# Patient Record
Sex: MALE | Race: WHITE
[De-identification: names, ages, dates, MRNs, and addresses within clinical notes are randomized per-mention and may not be internally consistent; named-entity substitution may affect disease eponyms.]

---

## 2018-10-13 ENCOUNTER — HOSPITAL ENCOUNTER (EMERGENCY)
Dept: HOSPITAL 25 - ED | Age: 68
LOS: 1 days | Discharge: HOME | End: 2018-10-14
Payer: COMMERCIAL

## 2018-10-13 DIAGNOSIS — R11.0: ICD-10-CM

## 2018-10-13 DIAGNOSIS — K52.9: Primary | ICD-10-CM

## 2018-10-13 DIAGNOSIS — R07.9: ICD-10-CM

## 2018-10-13 PROCEDURE — 82150 ASSAY OF AMYLASE: CPT

## 2018-10-13 PROCEDURE — 86140 C-REACTIVE PROTEIN: CPT

## 2018-10-13 PROCEDURE — 86900 BLOOD TYPING SEROLOGIC ABO: CPT

## 2018-10-13 PROCEDURE — 84484 ASSAY OF TROPONIN QUANT: CPT

## 2018-10-13 PROCEDURE — 80053 COMPREHEN METABOLIC PANEL: CPT

## 2018-10-13 PROCEDURE — 93005 ELECTROCARDIOGRAM TRACING: CPT

## 2018-10-13 PROCEDURE — 86850 RBC ANTIBODY SCREEN: CPT

## 2018-10-13 PROCEDURE — 96360 HYDRATION IV INFUSION INIT: CPT

## 2018-10-13 PROCEDURE — 36415 COLL VENOUS BLD VENIPUNCTURE: CPT

## 2018-10-13 PROCEDURE — 86901 BLOOD TYPING SEROLOGIC RH(D): CPT

## 2018-10-13 PROCEDURE — 85730 THROMBOPLASTIN TIME PARTIAL: CPT

## 2018-10-13 PROCEDURE — 85025 COMPLETE CBC W/AUTO DIFF WBC: CPT

## 2018-10-13 PROCEDURE — 85610 PROTHROMBIN TIME: CPT

## 2018-10-13 PROCEDURE — 99283 EMERGENCY DEPT VISIT LOW MDM: CPT

## 2018-10-13 PROCEDURE — 81003 URINALYSIS AUTO W/O SCOPE: CPT

## 2018-10-13 PROCEDURE — 83690 ASSAY OF LIPASE: CPT

## 2018-10-13 PROCEDURE — 83605 ASSAY OF LACTIC ACID: CPT

## 2018-10-13 PROCEDURE — 74174 CTA ABD&PLVS W/CONTRAST: CPT

## 2018-10-13 PROCEDURE — 71275 CT ANGIOGRAPHY CHEST: CPT

## 2018-10-13 PROCEDURE — 83735 ASSAY OF MAGNESIUM: CPT

## 2018-10-13 NOTE — XMS REPORT
Fawad Johnson

 Created on:2018



Patient:Fawad Johnson

Sex:Male

:1950

External Reference #:[id number]





Demographics







 Address  [address]



   [place] [address]

 

 Home Phone  [phone]

 

 Mobile Phone  [phone]

 

 Work Phone  [phone]

 

 Email Address  [email or web address]

 

 Preferred Language  English

 

 Marital Status  Not  Or 

 

 Amish Affiliation  Unknown

 

 Race  White

 

 Ethnic Group  Not  Or 









Author







 Organization  [facility]

 

 Address  [address]



   [place] [address]

 

 Phone  [phone]









Support







 Name  Relationship  Address  Phone

 

 Jordy Johnson  Unavailable  Unavailable  [phone]

 

 Tez Johnson  Unavailable  Unavailable  [phone]

 

 Joby Johnson  Unavailable  Unavailable  [phone]









Care Team Providers







 Name  Role  Phone

 

 Kinjal uTrcios MD  Primary Care Physician  Unavailable









Payers







 Type  Date  Identification Numbers  Payment Provider  Subscriber

 

 Commercial    Policy Number: [id number]  [facility]  Fawad Johnson









 PayID: [id number]  [address]









 [place] [address]







Advance Directives







 Type  Date  Description  Status  Comment

 

 Other Directive  2018  Health Care Proxy  Current and Verified  







Problems







 Date  Description  Provider  Status

 

 Onset: 2011  Heart valve replacement  Dianna Blanco M.D., FACP  Active









   Note: Aortic, tilting disc, INR goal 2.5-3.5









 Onset: 2017  Aneurysm of thoracic aorta  Sheryl Hernandez M.D.  Active

 

 Onset: 2015  History of repair of thoracic  Sheryl Hernandez M.D.  Active



   aortic aneurysm    

 

 Onset: 2017  Hereditary motor and sensory  Terry Steve M.D.,FACP  
Active



   neuropathy    









   Note: CMT ?type









 Onset: 2018  Aneurysm of infrarenal abdominal  Kinjal Turcios M.D.  
Active



   aorta    

 

 Onset: 2011  Kidney stone  Dianna Blanco M.D., FACP  Active

 

 Onset: 2014  Pure hypercholesterolemia  Sheryl Hernandez M.D.  Active

 

 Onset: 2014  Mitral valve disorder  Sheryl Hernandez M.D.  Active

 

 Onset: 2014  Congenital dilation of ascending  Sheryl Hernandez M.D.  Active



   aorta    

 

 Onset: 2015  Collagen disease  Sheryl Hernandez M.D.  Active

 

 Onset: 2015  Carpal tunnel syndrome  Jaclyn Ornelas M.D.  Active

 

 Onset: 2015  Polyneuropathy  Jaclyn Ornelas M.D.  Active

 

 Onset: 2017  Localized, primary osteoarthritis of  Mai Arzate M.D.  
Active



   the pelvic region and thigh    

 

 Onset: 2017  Periprosthetic osteolysis  Mai Arzate M.D.  Active

 

 Onset: 2017  Acquired thrombocytopenia  Terry Steve,  Active



     M.D.,FACP  









   Note: mild









 Onset: 2017  Prosthetic arthroplasty of the hip  Mai Arzate M.D.  
Active

 

 Onset: 2018  Congenital insufficiency of aortic  Sheryl Hernandez M.D.  
Active



   valve    

 

 Onset: 2017  Aortic valve disorder  Sheryl Hernandez M.D.  Inactive









 Inactive: 2017







Family History







 Date  Family Member(s)  Problem(s)  Comments

 

   General  Diabetes  

 

   General  Heart Disease  

 

   General  Hypertension  

 

   General  Cancer  

 

   General  Rheumatoid Arthritis  

 

   Father   due to Diabetes  ()

 

   Father  Coronary Artery Disease (CAD)  SD age 72 to CAGB.

 

   Father   due to CAD  ()

 

   Father  Aortic Aneurysm  

 

   Mother   due to Cancer, Breast  ()

 

   Mother  Breast Cancer  

 

   Children  None  

 

   Siblings  3  

 

   First Brother  Heart Disease  

 

   First Brother  65  

 

   Second Brother  60  

 

   Third Brother  58  







Social History







 Type  Date  Description  Comments

 

 Marital Status  2015    

 

 Lives With    Alone  

 

 Occupation    Professor  Nutritional sciences at



       [employer]. 2016: phased



       [employer]

 

 Cigarette Use    Never Smoked Cigarettes  

 

 ETOH Use    Denies alcohol use  

 

 Smoking    Patient has never smoked  

 

 Recreational Drug Use    Denies Drug Use  

 

 Daily Caffeine    consumes chocolate  



     frequently  

 

 Daily Caffeine    Mountain Dew  On occasion during long



       drive

 

 Exercise Type/Frequency    Exercises regularly  

 

 Exercise Limitations    Joint Pain  







Allergies, Adverse Reactions, Alerts







 Date  Description  Reaction  Status  Severity  Comments

 

 2011  No Known Drug Allergy    active    







Medications







 Medication  Date  Status  Form  Strength  Qnty  SIG  Indications  Ordering



                 Provider

 

 Toprol XL    Active  Tablets  25mg  90tab  1 by mouth    Sheryl    ER 24HR    s  every day    ALF Hernandez

 

 Warfarin    Active  Tablets  5mg  180ta  2 tablet by    Sheryl



 Sodium  /2013        bs  mouth mon, wed    David,



             fri, and 1.5 (    M.D.



             7.5mg)    



             tues,thur,sat,    



             sun and as    



             directed    

 

 Atorvastatin    Active  Tablets  10mg  60tab  take 1 tablet  E78.0  
Kinjal



 Calcium  /2012        s  on --    ALF Turcios

 

 Co Q-10    Active  Capsules  200mg  90cap  1 po qd    Unknown



           s      

 

 Amoxicillin    Active  Capsules  500mg  4caps  4 cap po  1 hr    Unknown



   /0000          prior to dental    



             work    

 

 Latanoprost    Active  Solution  0.005%    1 gtt OU daily    Carmine,



                 Jono MURILLO MD

 

 Timolol    Active  Solution  0.5%    Instill 1 Drop    Carmine



 Male          Into Both Eyes    Jono MURILLO



             Twice Daily    MD

 

 Fluoride Gel    Active        everyday    Unknown



   /0000              

 

 Tylenol    Active    325mg    1 po prn    Unknown



   /0000              

 

                 

 

 Azithromycin    Hx  Tablets  250mg  6tabs  2 tabs by mouth  R05            on day 1; 1 tab    Cotton,



   -          by mouth every    M.D.



             day on days 2-              

 

 Lovenox    Hx  Solution  80mg/0.8M  10uni  sc every 12          L  ts  hours, only for    Henry,



   -          medical    M.D.



             procedures..    



                 

 

 Lovenox    Hx  Solution  100mg/ml  20ml  1              subcutaneously    Henry,



   -          every 12 hours    M.D.



             as directed    



                 

 

 Lovenox    Hx  Solution  80mg/0.8M  10uni  once daily          L  ts      Henry,



   -              M.D.



                 

 

 Lovenox    Hx  Solution  100mg/ml  20uni  90mg sq twice a      day as directed    Rommel Hernandez              M.D.



                 

 

 Wrist Brace    Hx  Misc    2unit  left and right;    Jaclyn TENA



 Ultra-Lite  /        s  use at night    Stackman,



 Carpal  -          and as needed    M.D.



 Tunnel/One  12/10              



 Size, Open                



 Thumb                

 

 Fish Oil    Hx  Oil      1200mg 1 po    Dianna



   /          daily    Rommel Blanco M.D., FACP



                 

 

 Coumadin    Hx  Tablets  2.5mg  100ta  use as directed    Unknown



   /0000        bs      



   -              



                 

 

 Zestril    Hx  Tablets  2.5mg  60tab  1 tablet daily    Unknown



   /0000        s      



   -              



                 

 

 Toprol XL    Hx  Tablets  50mg  15tab  1/2 tablet    Sheryl



   /0000    ER 24HR    s  daily    Rommel Hernandez M.D.



                 

 

 Lumigan    Hx  Solution  0.01%    1 drop daily  365.10  Unknown



   /0000              



   -              



                 

 

 Aspirin    Hx  Chewtabs  81mg  30uni  daily - Patient    Unknown



 Childrens  /0000        ts  Not Taking    



   -              



                 

 

 Glucosamine  /00  Hx  Tablets          Unknown



   /0000              



   -              



                 

 

 Chondroitin  /  Hx            Unknown



   /0000              



   -              



                 

 

 Glucosamine &  00  Hx  Packet  [id number]    1 po qd-    Unknown



 Chrondroitin  /0000      mg    Patient Not    



   -          Taking    



                 

 

 B6  00  Hx  Tablets  50mg    1 po qd    Unknown



   /0000              



   -              



                 

 

 Ferrous  00/00  Hx  Tablets  324(38Fe)    1 by mouth    Unknown



 Gluconate  /0000      mg    every day (    



   -          restarted    



             taking 2 weeks    



   /          ago begin 2018)    

 

 Glucosamine  00  Hx  Tablets  1500mg    1 tablet po    Unknown



 HCL  /0000          daily as needed    



   -          ( last taken    



   2017)    



                 







Medications Administered in Office







 Medication  Date  Status  Form  Strength  Qnty  SIG  Indications  Ordering



                 Provider

 

 Technetium TC    Administered  Injection          Kosta MURILLO



 99M  018              DO [place]



 Tetrofosmin,                FACC



 Per Unit Dose                



 Up To 40                



 Millicuries                







Immunizations







 CPT Code  Status  Date  Vaccine  Lot #

 

 22445  Given  2016  Pneumonia Vaccine  [id number]

 

 42349  Given  2015  Pneumococcal Conjugate Vaccine 13 Valent For  [id number]



       Intramuscular Use  

 

 52020  Given  2011  Zoster (Zostavax)  [id number]

 

 87658  Given  2011  Tdap - Tetanus/Diptheria/Acellular Pertussis  [id number]







Vital Signs







 Date  Vital  Result  Comment

 

 2018  Height  73.5 inches  6'1.50"









 Weight  208.00 lb  w/shoes

 

 Heart Rate  74 /min  

 

 BP Systolic Sitting  106 mmHg  Lue reg cuff

 

 BP Diastolic Sitting  70 mmHg  Lue reg cuff

 

 BP Systolic Standing  114 mmHg  Lue reg cuff

 

 BP Diastolic Standing  74 mmHg  Lue reg cuff

 

 BMI (Body Mass Index)  27.1 kg/m2  

 

 Ejection Fraction  55-60%  Echo 2018  Height  73.5 inches  6'1.50"









 Weight  210.00 lb  

 

 Heart Rate  65 /min  

 

 BP Systolic Sitting  98 mmHg  

 

 BP Diastolic Sitting  53 mmHg  

 

 O2 % BldC Oximetry  95 %  

 

 BMI (Body Mass Index)  27.3 kg/m2  









 2018  Height  73.5 inches  6'1.50"









 Weight  212.50 lb  

 

 Heart Rate  55 /min  

 

 BP Systolic  110 mmHg  

 

 BP Diastolic  66 mmHg  

 

 Body Temperature  96.1 F  

 

 O2 % BldC Oximetry  96 %  

 

 BMI (Body Mass Index)  27.7 kg/m2  









 2018  Height  73.5 inches  6'1.50"









 Weight  216.00 lb  with out shoes

 

 Heart Rate  66 /min  

 

 BP Systolic Sitting  100 mmHg  Lue reg cuff

 

 BP Diastolic Sitting  70 mmHg  Lue reg cuff

 

 BP Systolic Standing  114 mmHg  Lue reg cuff

 

 BP Diastolic Standing  70 mmHg  Lue reg cuff

 

 Respiratory Rate  18 /min  

 

 BMI (Body Mass Index)  28.1 kg/m2  

 

 Ejection Fraction  55-60%  date 17 ECHO









 2018  Height  73.5 inches  6'1.50"









 Weight  213.00 lb  

 

 Heart Rate  66 /min  

 

 BP Systolic Sitting  108 mmHg  

 

 BP Diastolic Sitting  68 mmHg  

 

 Respiratory Rate  16 /min  

 

 BMI (Body Mass Index)  27.7 kg/m2  









 2018  Weight  220.00 lb  









 Heart Rate  62 /min  

 

 BP Systolic Sitting  94 mmHg  

 

 BP Diastolic Sitting  60 mmHg  

 

 Body Temperature  96.9 F  

 

 O2 % BldC Oximetry  99 %  









 2018  Weight  227.00 lb  









 Heart Rate  63 /min  

 

 BP Systolic  110 mmHg  

 

 BP Diastolic  60 mmHg  

 

 Body Temperature  96.2 F  

 

 O2 % BldC Oximetry  94 %  









 2018  Height  73.5 inches  6'1.50"









 Weight  215.00 lb  

 

 Heart Rate  70 /min  

 

 BP Systolic Sitting  120 mmHg  

 

 BP Diastolic Sitting  60 mmHg  

 

 Respiratory Rate  16 /min  

 

 BMI (Body Mass Index)  28.0 kg/m2  









 2018  Height  73 inches  6'1"









 Weight  221.00 lb  without shoes

 

 Heart Rate  68 /min  

 

 BP Systolic Sitting  110 mmHg  Lue lg cuff

 

 BP Diastolic Sitting  62 mmHg  Lue lg cuff

 

 BP Systolic Standing  112 mmHg  Lue lg cuff

 

 BP Diastolic Standing  64 mmHg  Lue lg cuff

 

 Respiratory Rate  17 /min  

 

 BMI (Body Mass Index)  29.2 kg/m2  









 2018  Height  73 inches  6'1"









 Weight  219.00 lb  

 

 Heart Rate  67 /min  

 

 BP Systolic  118 mmHg  

 

 BP Diastolic  64 mmHg  

 

 Body Temperature  96.5 F  

 

 O2 % BldC Oximetry  98 %  

 

 BMI (Body Mass Index)  28.9 kg/m2  









 2017  Height  73 inches  6'1"









 Weight  215.00 lb  

 

 Heart Rate  56 /min  

 

 BP Systolic  124 mmHg  

 

 BP Diastolic  84 mmHg  

 

 Body Temperature  98.0 F  

 

 O2 % BldC Oximetry  96 %  

 

 BMI (Body Mass Index)  28.4 kg/m2  









 2017  Height  72 inches  6'0"









 Weight  224.00 lb  

 

 Heart Rate  70 /min  

 

 BP Systolic Sitting  112 mmHg  

 

 BP Diastolic Sitting  62 mmHg  

 

 Body Temperature  97.0 F  

 

 O2 % BldC Oximetry  95 %  

 

 BMI (Body Mass Index)  30.4 kg/m2  









 2017  Height  73 inches  6'1"









 Weight  221.00 lb  

 

 Heart Rate  60 /min  

 

 BP Systolic Sitting  106 mmHg  

 

 BP Diastolic Sitting  66 mmHg  

 

 Body Temperature  97.0 F  

 

 O2 % BldC Oximetry  99 %  

 

 BMI (Body Mass Index)  29.2 kg/m2  









 2017  Height  72 inches  6'0"









 Weight  221.00 lb  

 

 Heart Rate  54 /min  

 

 BP Systolic  120 mmHg  

 

 BP Diastolic  77 mmHg  

 

 Body Temperature  97.0 F  

 

 Pain Level  3  

 

 BMI (Body Mass Index)  30.0 kg/m2  









 2017  Height  72 inches  6'0"









 Weight  219.00 lb  

 

 Heart Rate  59 /min  

 

 BP Systolic Sitting  105 mmHg  

 

 BP Diastolic Sitting  80 mmHg  

 

 Body Temperature  96.7 F  

 

 O2 % BldC Oximetry  98 %  

 

 BMI (Body Mass Index)  29.7 kg/m2  









 2017  Height  72 inches  6'0"









 Weight  213.00 lb  

 

 Heart Rate  58 /min  

 

 BP Systolic Sitting  100 mmHg  

 

 BP Diastolic Sitting  70 mmHg  

 

 Respiratory Rate  12 /min  

 

 BMI (Body Mass Index)  28.9 kg/m2  









 2017  Weight  219.38 lb  









 Heart Rate  59 /min  

 

 BP Systolic Sitting  110 mmHg  

 

 BP Diastolic Sitting  60 mmHg  

 

 Body Temperature  96.8 F  

 

 O2 % BldC Oximetry  97 %  









 2017  Weight  219.00 lb  









 Heart Rate  56 /min  

 

 BP Systolic  117 mmHg  

 

 BP Diastolic  73 mmHg  

 

 Body Temperature  97.8 F  

 

 O2 % BldC Oximetry  97 %  









 2017  Height  73 inches  6'1"









 Weight  220.00 lb  w/o shoes

 

 Heart Rate  64 /min  reg

 

 BP Systolic Sitting  124 mmHg  Rue, reg cuff

 

 BP Diastolic Sitting  80 mmHg  Rue, reg cuff

 

 BP Systolic Standing  120 mmHg  Rue

 

 BP Diastolic Standing  80 mmHg  Rue

 

 Respiratory Rate  16 /min  

 

 BMI (Body Mass Index)  29.0 kg/m2  

 

 Ejection Fraction  55-60%  as of 17 echo









 2017  Heart Rate  64 /min  









 BP Systolic  126 mmHg  

 

 BP Diastolic  73 mmHg  









 2017  Height  73 inches  6'1"









 Weight  218.00 lb  

 

 Heart Rate  55 /min  

 

 BP Systolic  112 mmHg  

 

 BP Diastolic  62 mmHg  

 

 Body Temperature  95.8 F  

 

 O2 % BldC Oximetry  98 %  

 

 BMI (Body Mass Index)  28.8 kg/m2  









 2017  Height  72.75 inches  6'0.75"









 Weight  218.00 lb  

 

 Heart Rate  68 /min  

 

 BP Systolic Sitting  114 mmHg  right arm, reg cuff

 

 BP Diastolic Sitting  78 mmHg  right arm, reg cuff

 

 BP Systolic Standing  112 mmHg  right arm, reg cuff

 

 BP Diastolic Standing  78 mmHg  right arm, reg cuff

 

 Respiratory Rate  16 /min  

 

 BMI (Body Mass Index)  29.0 kg/m2  

 

 Ejection Fraction  60%  5/23/14









 01/10/2017  Height  72.75 inches  6'0.75"









 Weight  219.00 lb  

 

 Heart Rate  60 /min  

 

 BP Systolic Sitting  130 mmHg  

 

 BP Diastolic Sitting  78 mmHg  

 

 Respiratory Rate  14 /min  

 

 BMI (Body Mass Index)  29.1 kg/m2  









 2016  Weight  219.00 lb  









 Heart Rate  64 /min  

 

 BP Systolic Sitting  132 mmHg  

 

 BP Diastolic Sitting  84 mmHg  

 

 Respiratory Rate  15 /min  

 

 Body Temperature  97.0 F  

 

 O2 % BldC Oximetry  98 %  









 2016  Height  72.75 inches  6'0.75"









 Weight  212.00 lb  

 

 Heart Rate  60 /min  

 

 BP Systolic Sitting  112 mmHg  

 

 BP Diastolic Sitting  64 mmHg  

 

 Respiratory Rate  16 /min  

 

 BMI (Body Mass Index)  28.2 kg/m2  









 2016  Height  72.75 inches  6'0.75"









 Weight  207.00 lb  

 

 Heart Rate  52 /min  

 

 BP Systolic Sitting  122 mmHg  

 

 BP Diastolic Sitting  70 mmHg  

 

 Respiratory Rate  14 /min  

 

 BMI (Body Mass Index)  27.5 kg/m2  









 2016  Height  72.75 inches  6'0.75"









 Weight  217.00 lb  with boots

 

 Heart Rate  74 /min  

 

 BP Systolic Sitting  118 mmHg  right arm, reg cuff

 

 BP Diastolic Sitting  62 mmHg  right arm, reg cuff

 

 BP Systolic Standing  114 mmHg  right arm, reg cuff

 

 BP Diastolic Standing  62 mmHg  right arm, reg cuff

 

 Respiratory Rate  16 /min  

 

 BMI (Body Mass Index)  28.8 kg/m2  

 

 Ejection Fraction  60%  2016  Heart Rate  66 /min  









 BP Systolic Sitting  103 mmHg  

 

 BP Diastolic Sitting  62 mmHg  

 

 Body Temperature  95.2 F  

 

 O2 % BldC Oximetry  98 %  









 01/15/2016  Weight  219.00 lb  









 Heart Rate  80 /min  

 

 BP Systolic Sitting  109 mmHg  

 

 BP Diastolic Sitting  64 mmHg  

 

 Body Temperature  97.1 F  

 

 O2 % BldC Oximetry  96 %  









 01/15/2016  Height  72.75 inches  6'0.75"









 Heart Rate  80 /min  

 

 BP Systolic Sitting  128 mmHg  

 

 BP Diastolic Sitting  80 mmHg  

 

 Respiratory Rate  15 /min  

 

 Body Temperature  98.3 F  









 2016  Height  72.75 inches  6'0.75"









 Weight  219.00 lb  

 

 Heart Rate  60 /min  

 

 BP Systolic Sitting  101 mmHg  

 

 BP Diastolic Sitting  55 mmHg  

 

 Body Temperature  95.4 F  

 

 O2 % BldC Oximetry  96 %  

 

 BMI (Body Mass Index)  29.1 kg/m2  









 2015  Height  73 inches  6'1"









 Weight  208.00 lb  

 

 Heart Rate  60 /min  

 

 BP Systolic Sitting  100 mmHg  

 

 BP Diastolic Sitting  68 mmHg  

 

 Respiratory Rate  16 /min  

 

 BMI (Body Mass Index)  27.4 kg/m2  









 2015  Weight  212.00 lb  









 Heart Rate  53 /min  

 

 BP Systolic Sitting  107 mmHg  105/72

 

 BP Diastolic Sitting  65 mmHg  105/72

 

 BP Systolic Standing  112 mmHg  

 

 BP Diastolic Standing  73 mmHg  

 

 BP Systolic Lying Down  116 mmHg  

 

 BP Diastolic Lying Down  73 mmHg  

 

 Body Temperature  96.3 F  









 2015  Height  73 inches  6'1"









 Weight  214.00 lb  with shoes

 

 Heart Rate  58 /min  reg with ectopy

 

 BP Systolic Sitting  110 mmHg  La reg cuff

 

 BP Diastolic Sitting  70 mmHg  La reg cuff

 

 BP Systolic Standing  114 mmHg  La reg cuff

 

 BP Diastolic Standing  70 mmHg  La reg cuff

 

 Respiratory Rate  16 /min  

 

 BMI (Body Mass Index)  28.2 kg/m2  

 

 Ejection Fraction  60%  date 2015  Height  73 inches  6'1"









 Weight  209.00 lb  

 

 Heart Rate  56 /min  

 

 BP Systolic Sitting  108 mmHg  

 

 BP Diastolic Sitting  72 mmHg  

 

 Respiratory Rate  12 /min  

 

 BMI (Body Mass Index)  27.6 kg/m2  









 2015  Height  73 inches  6'1"









 Weight  214.00 lb  

 

 Heart Rate  74 /min  

 

 BP Systolic  108 mmHg  

 

 BP Diastolic  76 mmHg  

 

 Body Temperature  97.5 F  

 

 BMI (Body Mass Index)  28.2 kg/m2  









 2014  Weight  210.00 lb  









 Heart Rate  66 /min  

 

 BP Systolic Sitting  108 mmHg  LA reg cuff

 

 BP Diastolic Sitting  76 mmHg  LA reg cuff

 

 BP Systolic Standing  102 mmHg  LA

 

 BP Diastolic Standing  76 mmHg  LA

 

 Respiratory Rate  16 /min  









 2014  Height  73.5 inches  6'1.50"









 Weight  212.00 lb  with shoes off

 

 Heart Rate  58 /min  

 

 BP Systolic Sitting  110 mmHg  L arm with reg cuff

 

 BP Diastolic Sitting  60 mmHg  L arm with reg cuff

 

 BP Systolic Standing  112 mmHg  

 

 BP Diastolic Standing  70 mmHg  

 

 Respiratory Rate  18 /min  

 

 BMI (Body Mass Index)  27.6 kg/m2  









 2014  Height  73.5 inches  6'1.50"









 Weight  212.00 lb  

 

 Heart Rate  60 /min  

 

 BP Systolic Sitting  104 mmHg  LA reg cuff

 

 BP Diastolic Sitting  70 mmHg  LA reg cuff

 

 BP Systolic Standing  116 mmHg  LA

 

 BP Diastolic Standing  78 mmHg  LA

 

 Respiratory Rate  16 /min  

 

 BMI (Body Mass Index)  27.6 kg/m2  









 2013  Height  73.5 inches  6'1.50"









 Weight  210.00 lb  

 

 Heart Rate  60 /min  

 

 BP Systolic Sitting  126 mmHg  

 

 BP Diastolic Sitting  72 mmHg  

 

 BMI (Body Mass Index)  27.3 kg/m2  









 2013  Height  73.5 inches  6'1.50"









 Weight  211.75 lb  

 

 Heart Rate  56 /min  

 

 BP Systolic Sitting  110 mmHg  

 

 BP Diastolic Sitting  64 mmHg  

 

 BMI (Body Mass Index)  27.6 kg/m2  









 2013  Heart Rate  60 /min  









 BP Systolic Sitting  116 mmHg  

 

 BP Diastolic Sitting  68 mmHg  

 

 Respiratory Rate  16 /min  









 2012  Height  73.5 inches  6'1.50"









 Weight  213.00 lb  

 

 Heart Rate  54 /min  

 

 BP Systolic Sitting  122 mmHg  

 

 BP Diastolic Sitting  70 mmHg  

 

 BMI (Body Mass Index)  27.7 kg/m2  









 04/10/2012  Height  73.5 inches  6'1.50"









 Weight  208.00 lb  

 

 Heart Rate  60 /min  

 

 BP Systolic Sitting  110 mmHg  

 

 BP Diastolic Sitting  60 mmHg  

 

 BMI (Body Mass Index)  27.1 kg/m2  









 2011  Height  73.5 inches  6'1.50"









 Weight  210.00 lb  

 

 Heart Rate  66 /min  

 

 BP Systolic Sitting  132 mmHg  

 

 BP Diastolic Sitting  74 mmHg  

 

 BMI (Body Mass Index)  27.3 kg/m2  









 2011  Height  73.5 inches  6'1.50"









 Weight  209.00 lb  

 

 Heart Rate  62 /min  

 

 BP Systolic Sitting  106 mmHg  

 

 BP Diastolic Sitting  58 mmHg  

 

 BMI (Body Mass Index)  27.2 kg/m2  









 2011  Weight  207.25 lb  









 Heart Rate  64 /min  

 

 BP Systolic  90 mmHg  

 

 BP Diastolic  60 mmHg  







Results







 Test  Date  Test  Result  H/L  Range  Note

 

 Inr/Protime  2018  Inr  2.78  High  0.77-1.02  

 

 Inr/Protime  2018  Inr  3.51  High  0.77-1.02  

 

 Inr/Protime  2018  Inr  3.05  High  0.77-1.02  

 

 Inr/Protime  2018  Inr  3.09  High  0.77-1.02  

 

 Inr/Protime  2018  Inr  3.07  High  0.77-1.02  

 

 Inr/Protime  2018  Inr  2.87  High  0.77-1.02  

 

 Inr/Protime  2018  Inr  3.18  High  0.77-1.02  1

 

 Inr/Protime  2018  Inr  2.26  High  0.77-1.02  1

 

 Inr/Protime  2018  Inr  2.64  High  0.77-1.02  2

 

 Inr/Protime  2018  Inr  2.40  High  0.77-1.02  

 

 Inr/Protime  2018  Inr  3.28  High  0.77-1.02  

 

 Protein Electrophoresis  2018  Total Protein(Pep)  6.7 g/dL    6.3 - 7.9
  









 Albumin  3.6 g/dL    3.4-4.7  

 

 Alpha-1 Globulin  0.3 g/dL    0.1-0.3  

 

 Alpha-2 Globulin  0.6 g/dL    0.6-1.0  

 

 Beta Globulin  1.1 g/dL    0.7-1.2  

 

 Gamma Globulin  1.2 g/dL    0.6-1.6  

 

 Albumin/Globulin Ratio  1.14      

 

 Impression  See Comment      3









 Laboratory test finding  2018  Vitamin B12  885 pg/mL    180-914  4

 

 Inr/Protime  2018  Inr  2.69  High  0.77-1.02  

 

 CBC Auto Diff  2018  White Blood Count  5.6 10^3/uL    3.5-10.8  









 Red Blood Count  4.56 10^6/uL    4.0-5.4  

 

 Hemoglobin  15.0 g/dL    14.0-18.0  

 

 Hematocrit  44 %    42-52  

 

 Mean Corpuscular Volume  96 fL  High  80-94  

 

 Mean Corpuscular Hemoglobin  33 pg  High  27-31  

 

 Mean Corpuscular HGB Conc  34 g/dL    31-36  

 

 Red Cell Distribution Width  13 %    10.5-15  

 

 Platelet Count  141 10^3/uL  Low  150-450  

 

 Mean Platelet Volume  8 um3    7.4-10.4  

 

 Abs Neutrophils  3.7 10^3/uL    1.5-7.7  

 

 Abs Lymphocytes  1.2 10^3/uL    1.0-4.8  

 

 Abs Monocytes  0.6 10^3/uL    0-0.8  

 

 Abs Eosinophils  0.1 10^3/uL    0-0.6  

 

 Abs Basophils  0 10^3/uL    0-0.2  

 

 Abs Nucleated RBC  0 10^3/uL      

 

 Granulocyte %  65.6 %    38-83  

 

 Lymphocyte %  21.7 %  Low  25-47  

 

 Monocyte %  10.3 %  High  1-9  

 

 Eosinophil %  1.6 %    0-6  

 

 Basophil %  0.8 %    0-2  

 

 Nucleated Red Blood Cells %  0      









 Laboratory test finding  2018  Factor 11 Activity  91 %    55 - 150  5

 

 Factor 8 Profile  2018  Coagulation Factor VIII Activi  160 %    55 - 
200  6









 von Willebrand Factor Antigen  193 %    55 - 200  7

 

 VonWillibrand Factor Activity  169 %    55 - 200  8

 

 von Willebrand Panel Interp  See Comment      9









 Factor 13 Qualitative/Reflex  2018  Coagulation Factor  No Lysis    No 
Lysis  



     XIII        









 Factor XIII 1:1 Mix  Not Applicable      10









 Inr/Protime  2017  Inr  2.34  High  0.77-1.02  11

 

 Lipid Profile (Trig/Chol/HDL)  2017  Triglycerides  71 mg/dL      12









 Cholesterol  125 mg/dL      13

 

 HDL Cholesterol  44.4 mg/dL      14

 

 LDL Cholesterol  66 mg/dL      15









 Comp Metabolic Panel  2017  Sodium  140 mmol/L    133-145  









 Potassium  4.5 mmol/L    3.5-5.0  

 

 Chloride  106 mmol/L    101-111  

 

 Co2 Carbon Dioxide  29 mmol/L    22-32  

 

 Anion Gap  5 mmol/L    2-11  

 

 Glucose  86 mg/dL      

 

 Blood Urea Nitrogen  13 mg/dL    6-24  

 

 Creatinine  1.04 mg/dL    0.67-1.17  

 

 BUN/Creatinine Ratio  12.5    8-20  

 

 Calcium  8.8 mg/dL    8.6-10.3  

 

 Total Protein  6.3 g/dL  Low  6.4-8.9  

 

 Albumin  4.1 g/dL    3.2-5.2  

 

 Globulin  2.2 g/dL    2-4  

 

 Albumin/Globulin Ratio  1.9    1-3  

 

 Total Bilirubin  0.90 mg/dL    0.2-1.0  

 

 Alkaline Phosphatase  87 U/L      

 

 Alt  22 U/L    7-52  

 

 Ast  28 U/L    13-39  

 

 Egfr Non-  71.2    >60  

 

 Egfr   91.6    >60  16









 CBC Auto Diff  2017  White Blood Count  3.7 10^3/uL    3.5-10.8  









 Red Blood Count  4.62 10^6/uL    4.0-5.4  

 

 Hemoglobin  15.2 g/dL    14.0-18.0  

 

 Hematocrit  44 %    42-52  

 

 Mean Corpuscular Volume  95 fL  High  80-94  

 

 Mean Corpuscular Hemoglobin  33 pg  High  27-31  

 

 Mean Corpuscular HGB Conc  35 g/dL    31-36  

 

 Red Cell Distribution Width  13 %    10.5-15  

 

 Platelet Count  127 10^3/uL  Low  150-450  

 

 Mean Platelet Volume  8 um3    7.4-10.4  

 

 Abs Neutrophils  2.3 10^3/uL    1.5-7.7  

 

 Abs Lymphocytes  0.9 10^3/uL  Low  1.0-4.8  

 

 Abs Monocytes  0.4 10^3/uL    0-0.8  

 

 Abs Eosinophils  0.1 10^3/uL    0-0.6  

 

 Abs Basophils  0 10^3/uL    0-0.2  

 

 Abs Nucleated RBC  0 10^3/uL      

 

 Granulocyte %  62.1 %    38-83  

 

 Lymphocyte %  25.3 %    25-47  

 

 Monocyte %  9.4 %  High  1-9  

 

 Eosinophil %  2.1 %    0-6  

 

 Basophil %  1.1 %    0-2  

 

 Nucleated Red Blood Cells %  0.1      









 Laboratory test finding  2017  PSA Screening  1.059 ng/mL    0-4.000  17

 

 Inr/Protime  2017  Inr  2.89  High  0.89-1.11  

 

 Laboratory test finding  2017  Cobalt Serum  2.2 ng/mL      18









 Chromium  1.2 ng/mL    <0.3  19









 Inr/Protime  10/25/2017  Inr  2.69  High  0.89-1.11  

 

 Inr/Protime  2017  Inr  2.68  High  0.89-1.11  

 

 Laboratory test finding  2017  Vitamin B12  1128 pg/mL  High  180-914  20

 

 Comp Metabolic Panel  2017  Sodium  142 mmol/L    133-145  









 Potassium  4.4 mmol/L    3.5-5.0  

 

 Chloride  107 mmol/L    101-111  

 

 Co2 Carbon Dioxide  31 mmol/L    22-32  

 

 Anion Gap  4 mmol/L    2-11  

 

 Glucose  91 mg/dL      

 

 Blood Urea Nitrogen  15 mg/dL    6-24  

 

 Creatinine  1.10 mg/dL    0.67-1.17  

 

 BUN/Creatinine Ratio  13.6    8-20  

 

 Calcium  9.5 mg/dL    8.6-10.3  

 

 Total Protein  6.4 g/dL    6.4-8.9  

 

 Albumin  4.2 g/dL    3.2-5.2  

 

 Globulin  2.2 g/dL    2-4  

 

 Albumin/Globulin Ratio  1.9    1-3  

 

 Total Bilirubin  0.90 mg/dL    0.2-1.0  

 

 Alkaline Phosphatase  75 U/L      

 

 Alt  23 U/L    7-52  

 

 Ast  30 U/L    13-39  

 

 Egfr Non-  66.8    >60  

 

 Egfr   85.9    >60  21









 Laboratory test finding  2017  Erythrocyte Sed Rate  2 mm/Hr    0-40  









 C Reactive Protein  < 1.00 mg/L    < 5.00  22

 

 TSH (Thyroid Stim Horm)  2.65 mcIU/mL    0.34-5.60  

 

 Creatine Kinase(CK)  282 U/L  High    









 CBC Auto Diff  2017  White Blood Count  4.2 10^3/uL    3.5-10.8  









 Red Blood Count  4.85 10^6/uL    4.0-5.4  

 

 Hemoglobin  15.8 g/dL    14.0-18.0  

 

 Hematocrit  46 %    42-52  

 

 Mean Corpuscular Volume  95 fL  High  80-94  

 

 Mean Corpuscular Hemoglobin  33 pg  High  27-31  

 

 Mean Corpuscular HGB Conc  34 g/dL    31-36  

 

 Red Cell Distribution Width  14 %    10.5-15  

 

 Platelet Count  129 10^3/uL  Low  150-450  

 

 Mean Platelet Volume  8 um3    7.4-10.4  

 

 Abs Neutrophils  2.6 10^3/uL    1.5-7.7  

 

 Abs Lymphocytes  1.1 10^3/uL    1.0-4.8  

 

 Abs Monocytes  0.3 10^3/uL    0-0.8  

 

 Abs Eosinophils  0.1 10^3/uL    0-0.6  

 

 Abs Basophils  0 10^3/uL    0-0.2  

 

 Abs Nucleated RBC  0 10^3/uL      

 

 Granulocyte %  62.1 %    38-83  

 

 Lymphocyte %  26.7 %    25-47  

 

 Monocyte %  8.3 %    1-9  

 

 Eosinophil %  2.1 %    0-6  

 

 Basophil %  0.8 %    0-2  

 

 Nucleated Red Blood Cells %  0      









 Inr/Protime  2017  Inr  4.05  High  0.89-1.11  

 

 Inr/Protime  2017  Inr  3.25  High  0.89-1.11  

 

 Laboratory test finding  2017  Creatine Kinase(CK)  252 U/L  High  10-
223  23

 

 Inr/Protime  2017  Inr  3.49  High  0.89-1.11  

 

 Laboratory test finding  2017  Lyme Disease Serology  Negative    
Negative  24









 Erythrocyte Sed Rate  6 mm/Hr    0-40  25

 

 Creatine Kinase(CK)  298 U/L  High    26









 CBC Auto Diff  2017  White Blood Count  5.8 10^3/uL    3.5-10.8  









 Red Blood Count  4.70 10^6/uL    4.0-5.4  

 

 Hemoglobin  15.4 g/dL    14.0-18.0  

 

 Hematocrit  45 %    42-52  

 

 Mean Corpuscular Volume  97 fL  High  80-94  

 

 Mean Corpuscular Hemoglobin  33 pg  High  27-31  

 

 Mean Corpuscular HGB Conc  34 g/dL    31-36  

 

 Red Cell Distribution Width  13 %    10.5-15  

 

 Platelet Count  123 10^3/uL  Low  150-450  

 

 Mean Platelet Volume  9 um3    7.4-10.4  

 

 Abs Neutrophils  3.5 10^3/uL    1.5-7.7  

 

 Abs Lymphocytes  1.6 10^3/uL    1.0-4.8  

 

 Abs Monocytes  0.5 10^3/uL    0-0.8  

 

 Abs Eosinophils  0.1 10^3/uL    0-0.6  

 

 Abs Basophils  0 10^3/uL    0-0.2  

 

 Abs Nucleated RBC  0.01 10^3/uL      

 

 Granulocyte %  61.5 %    38-83  

 

 Lymphocyte %  27.6 %    25-47  

 

 Monocyte %  8.1 %    1-9  

 

 Eosinophil %  2.1 %    0-6  

 

 Basophil %  0.7 %    0-2  

 

 Nucleated Red Blood Cells %  0.1      









 Basic Metabolic Panel  2017  Sodium  140 mmol/L    133-145  









 Potassium  4.5 mmol/L    3.5-5.0  

 

 Chloride  107 mmol/L    101-111  

 

 Co2 Carbon Dioxide  30 mmol/L    22-32  

 

 Anion Gap  3 mmol/L    2-11  

 

 Glucose  81 mg/dL      

 

 Blood Urea Nitrogen  14 mg/dL    6-24  

 

 Creatinine  1.06 mg/dL    0.67-1.17  

 

 BUN/Creatinine Ratio  13.2    8-20  

 

 Calcium  9.0 mg/dL    8.6-10.3  

 

 Egfr Non-  69.7    >60  

 

 Egfr   89.6    >60  27









 Inr/Protime  2017  Inr  3.17  High  0.89-1.11  

 

 Inr/Protime  2017  Inr  3.08  High  0.89-1.11  

 

 Inr/Protime  2017  Inr  2.99  High  0.89-1.11  

 

 Inr/Protime  05/15/2017  Inr  1.80  High  0.89-1.11  

 

 Laboratory test  2017  Surgical Interface  SEE RESULT      28



 finding    Order  BELOW      

 

 Inr/Protime  2017  Inr  3.00  High  0.89-1.11  

 

 Inr/Protime  03/15/2017  Inr  3.32  High  0.89-1.11  

 

 Inr/Protime  02/15/2017  Inr  2.75  High  0.89-1.11  

 

 Creatinine  2017  Creatinine  1.03 mg/dL    0.67-1.17  









 Egfr Non-  72.0    >60  

 

 Egfr   92.6    >60  29









 Laboratory test finding  2017  Blood Urea Nitrogen BUN  14 mg/dL    6-24
  

 

 Inr/Protime  2017  Inr  3.05  High  0.89-1.11  

 

 Lipid Profile  2017  Triglycerides  81 mg/dL      30



 (Trig/Chol/HDL)            









 Cholesterol  131 mg/dL      31

 

 HDL Cholesterol  46.9 mg/dL      32

 

 LDL Cholesterol  68 mg/dL      33









 Inr/Protime  2016  Inr  2.76  High  0.89-1.11  

 

 Urinalysis Profile  2016  Urine Color  Yellow      









 Urine Appearance  Clear      

 

 Urine Specific Gravity  1.009  Low  1.010-1.030  

 

 Urine pH  6.0    5-9  

 

 Urine Urobilinogen  Negative    Negative  

 

 Urine Ketones  Negative    Negative  

 

 Urine Protein  Negative    Negative  

 

 Urine Leukocytes  Negative    Negative  

 

 Urine Blood  1+    Negative  

 

 Urine Nitrite  Negative    Negative  

 

 Urine Bilirubin  Negative    Negative  

 

 Urine Glucose  Negative    Negative  

 

 Urine White Blood Cell  Absent    Absent  

 

 Urine Red Blood Cell  Trace(0-2/hpf)    Absent  

 

 Urine Bacteria  Absent    Absent  









 Urinalysis Profile  2016  Urine Color  Yellow      









 Urine Appearance  Clear      

 

 Urine Specific Gravity  1.017    1.010-1.030  

 

 Urine pH  5.0    5-9  

 

 Urine Urobilinogen  Negative    Negative  

 

 Urine Ketones  Negative    Negative  

 

 Urine Protein  Negative    Negative  

 

 Urine Leukocytes  Negative    Negative  

 

 Urine Blood  1+    Negative  

 

 Urine Nitrite  Negative    Negative  

 

 Urine Bilirubin  Negative    Negative  

 

 Urine Glucose  Negative    Negative  

 

 Urine White Blood Cell  Trace(0-5/hpf)    Absent  

 

 Urine Red Blood Cell  Trace(0-2/hpf)    Absent  

 

 Urine Bacteria  Absent    Absent  

 

 Urine Squamous Epithelial Cell  Present    Absent  









 Comp Metabolic Panel  2016  Sodium  139 mmol/L    133-145  









 Potassium  4.4 mmol/L    3.5-5.0  

 

 Chloride  105 mmol/L    101-111  

 

 Co2 Carbon Dioxide  30 mmol/L    22-32  

 

 Anion Gap  4 mmol/L    2-11  

 

 Glucose  84 mg/dL      

 

 Blood Urea Nitrogen  14 mg/dL    6-24  

 

 Creatinine  1.08 mg/dL    0.67-1.17  

 

 BUN/Creatinine Ratio  13.0    8-20  

 

 Calcium  8.9 mg/dL    8.6-10.3  

 

 Total Protein  6.4 g/dL    6.4-8.9  

 

 Albumin  4.0 g/dL    3.2-5.2  

 

 Globulin  2.4 g/dL    2-4  

 

 Albumin/Globulin Ratio  1.7    1-3  

 

 Total Bilirubin  0.80 mg/dL    0.2-1.0  

 

 Alkaline Phosphatase  85 U/L      

 

 Alt  25 U/L    7-52  

 

 Ast  32 U/L    13-39  

 

 Egfr Non-  68.4    >60  

 

 Egfr   88.0    >60  34









 CBC Auto Diff  2016  White Blood Count  4.9 10^3/uL    3.5-10.8  









 Red Blood Count  4.92 10^6/uL    4.0-5.4  

 

 Hemoglobin  15.7 g/dL    14.0-18.0  

 

 Hematocrit  46 %    42-52  

 

 Mean Corpuscular Volume  94 fL    80-94  

 

 Mean Corpuscular Hemoglobin  32 pg  High  27-31  

 

 Mean Corpuscular HGB Conc  34 g/dL    31-36  

 

 Red Cell Distribution Width  13 %    10.5-15  

 

 Platelet Count  136 10^3/uL  Low  150-450  

 

 Mean Platelet Volume  9 um3    7.4-10.4  

 

 Abs Neutrophils  3.3 10^3/uL    1.5-7.7  

 

 Abs Lymphocytes  1.1 10^3/uL    1.0-4.8  

 

 Abs Monocytes  0.4 10^3/uL    0-0.8  

 

 Abs Eosinophils  0.1 10^3/uL    0-0.6  

 

 Abs Basophils  0.1 10^3/uL    0-0.2  

 

 Abs Nucleated RBC  0 10^3/uL      

 

 Granulocyte %  66.0 %    38-83  

 

 Lymphocyte %  22.4 %  Low  25-47  

 

 Monocyte %  9.1 %  High  1-9  

 

 Eosinophil %  1.4 %    0-6  

 

 Basophil %  1.1 %    0-2  

 

 Nucleated Red Blood Cells %  0.1      









 Laboratory test finding  2016  Amylase  38 U/L      









 Lipase  30 U/L    11.0-82.0  









 Inr/Protime  2016  Inr  3.32  High  0.89-1.11  

 

 Inr/Protime  10/26/2016  Inr  3.34  High  0.89-1.11  

 

 Inr/Protime  2016  Inr  3.09  High  0.89-1.11  

 

 Inr/Protime  2016  Inr  3.30  High  0.89-1.11  

 

 Inr/Protime  2016  Inr  2.65  High  0.89-1.11  

 

 Inr/Protime  2016  Inr  3.51  High  0.89-1.11  

 

 Inr/Protime  2016  Inr  2.35  High  0.89-1.11  

 

 Inr/Protime  2016  Inr  2.65  High  0.89-1.11  

 

 Inr/Protime  2016  Inr  3.34  High  0.89-1.11  

 

 Inr/Protime  2016  Inr  2.99  High  0.89-1.11  

 

 Inr/Protime  2016  Inr  2.74  High  0.89-1.11  

 

 Inr/Protime  2016  Inr  3.99  High  0.89-1.11  

 

 Inr/Protime  2016  Inr  2.91  High  0.89-1.11  

 

 Inr/Protime  2016  Inr  2.82  High  0.89-1.11  

 

 Inr/Protime  2016  Inr  4.21  High  0.89-1.11  

 

 Laboratory test finding  2016  PSA Diagnostic  0.611 ng/mL    0-4.0  

 

 Laboratory test finding  2016  Inr/Protime  2.56  High  0.89-1.11  35

 

 Laboratory test finding  2016  Inr/Protime  4.58  High  0.89-1.11  36

 

 Laboratory test finding  2016  Inr/Protime  5.30  High  0.89-1.11  37

 

 CBC No Diff  2016  White Blood Count  3.7 10^3/uL    3.5-10.8  









 Red Blood Count  4.07 10^6/uL    4.0-5.4  

 

 Hemoglobin  12.1 g/dL  Low  14.0-18.0  

 

 Hematocrit  38 %  Low  42-52  

 

 Mean Corpuscular Volume  92 fL    80-94  

 

 Mean Corpuscular Hemoglobin  30 pg    27-31  

 

 Mean Corpuscular HGB Conc  32 g/dL    31-36  

 

 Red Cell Distribution Width  14 %    10.5-15  

 

 Platelet Count  169 10^3/uL    150-450  

 

 Mean Platelet Volume  8 um3    7.4-10.4  









 Iron & Iron Binding Capacity  2016  Iron  372 g/dL  High    









 Unsaturated Iron Binding  84 g/dL      

 

 Total Iron Binding Capacity  456 g/dL  High  250-450  

 

 % Iron Saturation  82 %  High  15-55  









 Laboratory test finding  2016  Inr/Protime  2.89  High  0.89-1.11  38

 

 Laboratory test finding  2016  Inr/Protime  2.28  High  0.89-1.11  39

 

 Laboratory test finding  2016  Inr/Protime  1.81  High  0.89-1.11  40

 

 Laboratory test finding  2016  Inr/Protime  1.61  High  0.89-1.11  

 

 Laboratory test finding  2016  Inr/Protime  1.41  High  0.89-1.11  

 

 Laboratory test finding  2016  Inr/Protime  1.32  High  0.89-1.11  

 

 Laboratory test finding  2016  Inr/Protime  1.23  High  0.89-1.11  41

 

 Inr/Protime  2016  Inr  1.14  High  0.89-1.11  

 

 Laboratory test finding  2016  Inr/Protime  1.15  High  0.89-1.11  

 

 CBC Auto Diff  2016  White Blood Count  5.5 10^3/uL    3.5-10.8  









 Red Blood Count  1.99 10^6/uL  Low  4.0-5.4  

 

 Hematocrit  19 %  Low  42-52  

 

 Mean Corpuscular Volume  97 fL  High  80-94  

 

 Mean Corpuscular Hemoglobin  32 pg  High  27-31  

 

 Mean Corpuscular HGB Conc  33 g/dL    31-36  

 

 Red Cell Distribution Width  15 %    10.5-15  

 

 Platelet Count  142 10^3/uL  Low  150-450  

 

 Mean Platelet Volume  8 um3    7.4-10.4  

 

 Hemoglobin  6.3 g/dL  Low  14.0-18.0  42

 

 Abs Neutrophils  4.4 10^3/uL    1.5-7.7  

 

 Abs Lymphocytes  0.6 10^3/uL  Low  1.0-4.8  

 

 Abs Monocytes  0.4 10^3/uL    0-0.8  

 

 Abs Eosinophils  1.0 10^3/uL  High  0-0.6  

 

 Abs Basophils  1.1 10^3/uL  High  0-0.2  

 

 Abs Nucleated RBC  0.06 10^3/uL      









 Manual Differential  2016  Immature Granulocytes  4 %    0-9  









 Neutrophil %  72 %    38-83  

 

 Band %  4 %    0-8  

 

 Lymphocytes %  22 %  Low  25-47  

 

 Monocytes %  2 %    0-13  

 

 Macrocytosis  1+      

 

 Microcytosis  1+      

 

 Polychromasia  1+      

 

 Stomatocytes  1+      









 Laboratory test finding  2016  Inr/Protime  1.15  High  0.89-1.11  

 

 CBC Auto Diff  2016  White Blood Count  5.5 10^3/uL    3.5-10.8  









 Red Blood Count  1.99 10^6/uL  Low  4.0-5.4  

 

 Hematocrit  19 %  Low  42-52  

 

 Mean Corpuscular Volume  97 fL  High  80-94  

 

 Mean Corpuscular Hemoglobin  32 pg  High  27-31  

 

 Mean Corpuscular HGB Conc  33 g/dL    31-36  

 

 Red Cell Distribution Width  15 %    10.5-15  

 

 Platelet Count  142 10^3/uL  Low  150-450  

 

 Mean Platelet Volume  8 um3    7.4-10.4  

 

 Hemoglobin  6.3 g/dL  Low  14.0-18.0  43

 

 Abs Neutrophils  4.4 10^3/uL    1.5-7.7  

 

 Abs Lymphocytes  0.6 10^3/uL  Low  1.0-4.8  

 

 Abs Monocytes  0.4 10^3/uL    0-0.8  

 

 Abs Eosinophils  1.0 10^3/uL  High  0-0.6  

 

 Abs Basophils  1.1 10^3/uL  High  0-0.2  

 

 Abs Nucleated RBC  0.06 10^3/uL      









 Manual Differential  2016  Immature Granulocytes  4 %    0-9  









 Neutrophil %  72 %    38-83  

 

 Band %  4 %    0-8  

 

 Lymphocytes %  22 %  Low  25-47  

 

 Monocytes %  2 %    0-13  

 

 Macrocytosis  1+      

 

 Microcytosis  1+      

 

 Polychromasia  1+      

 

 Stomatocytes  1+      









 Type & Screen  2016  Patient Blood Type  A Positive      









 Antibody Screen  NEGATIVE      









 Laboratory test finding  2016  Packed Cells  SEE RESULTS BELO <SEE      
44



       NOTE>      

 

 Comp Metabolic Panel  2016  Sodium  140 mmol/L    133-145  









 Potassium  3.2 mmol/L  Low  3.5-5.0  

 

 Chloride  106 mmol/L    101-111  

 

 Co2 Carbon Dioxide  27 mmol/L    22-32  

 

 Anion Gap  7 mmol/L    2-11  

 

 Glucose  104 mg/dL  High    

 

 Blood Urea Nitrogen  12 mg/dL    6-24  

 

 Creatinine  1.02 mg/dL    0.67-1.17  

 

 BUN/Creatinine Ratio  11.8    8-20  

 

 Calcium  8.0 mg/dL  Low  8.6-10.3  

 

 Total Protein  5.1 g/dL  Low  6.4-8.9  

 

 Albumin  3.5 g/dL    3.2-5.2  

 

 Globulin  1.6 g/dL  Low  2-4  

 

 Albumin/Globulin Ratio  2.2    1-3  

 

 Total Bilirubin  0.60 mg/dL    0.2-1.0  

 

 Alkaline Phosphatase  43 U/L      

 

 Alt  54 U/L  High  7-52  

 

 Ast  39 U/L    13-39  

 

 Egfr Non-  73.1    >60  

 

 Egfr   94.0    >60  45









 CBC Auto Diff  2016  White Blood Count  3.5 10^3/uL    3.5-10.8  









 Red Blood Count  2.26 10^6/uL  Low  4.0-5.4  

 

 Hemoglobin  7.3 g/dL  Low  14.0-18.0  

 

 Hematocrit  22 %  Low  42-52  

 

 Mean Corpuscular Volume  97 fL  High  80-94  

 

 Mean Corpuscular Hemoglobin  32 pg  High  27-31  

 

 Mean Corpuscular HGB Conc  34 g/dL    31-36  

 

 Red Cell Distribution Width  15 %    10.5-15  

 

 Platelet Count  129 10^3/uL  Low  150-450  

 

 Mean Platelet Volume  9 um3    7.4-10.4  

 

 Abs Neutrophils  2.3 10^3/uL    1.5-7.7  

 

 Abs Lymphocytes  0.8 10^3/uL  Low  1.0-4.8  

 

 Abs Monocytes  0.3 10^3/uL    0-0.8  

 

 Abs Eosinophils  0.1 10^3/uL    0-0.6  

 

 Abs Basophils  0 10^3/uL    0-0.2  

 

 Abs Nucleated RBC  0.02 10^3/uL      

 

 Granulocyte %  66.5 %    38-83  

 

 Lymphocyte %  23.7 %  Low  25-47  

 

 Monocyte %  7.7 %    1-9  

 

 Eosinophil %  1.6 %    0-6  

 

 Basophil %  0.5 %    0-2  

 

 Nucleated Red Blood Cells %  0.6      









 Inr/Protime  2016  Inr  1.03    0.89-1.11  

 

 Laboratory test finding  2016  C Reactive Protein  1.59 mg/L    < 5.00  
46









 Troponin-I (TnI)  0.00 ng/mL    <0.03  47

 

 Packed Cells  SEE RESULTS BELO <SEE NOTE>      48

 

 Fresh Frozen Plasma  SEE RESULTS BELO <SEE NOTE>      49









 Comp Metabolic Panel  2016  Co2 Carbon Dioxide  28 mmol/L    22-32  









 Glucose  134 mg/dL  High    

 

 Blood Urea Nitrogen  33 mg/dL  High  6-24  

 

 Creatinine  1.22 mg/dL  High  0.67-1.17  

 

 BUN/Creatinine Ratio  27.0  High  8-20  

 

 Calcium  9.2 mg/dL    8.6-10.3  

 

 Total Protein  5.8 g/dL  Low  6.4-8.9  

 

 Albumin  4.0 g/dL    3.2-5.2  

 

 Globulin  1.8 g/dL  Low  2-4  

 

 Albumin/Globulin Ratio  2.2    1-3  

 

 Total Bilirubin  1.00 mg/dL    0.2-1.0  

 

 Alkaline Phosphatase  52 U/L      

 

 Alt  131 U/L  High  7-52  

 

 Ast  113 U/L  High  13-39  

 

 Egfr Non-  59.4    >60  

 

 Egfr   76.4    >60  50

 

 Sodium  140 mmol/L    133-145  

 

 Potassium  4.5 mmol/L    3.5-5.0  

 

 Chloride  107 mmol/L    101-111  

 

 Anion Gap  5 mmol/L    2-11  









 Type & Screen  2016  Patient Blood Type  A Positive      









 Antibody Screen  NEGATIVE      









 Laboratory test finding  2016  Partial Thrombo Time  28.2 seconds    26.0
-36.3  



     PTT        









 Lactic Acid  1.0 mmol/L    0.5-2.0  51

 

 B-Type Natriuretic Peptide BNP  29 pg/mL      52









 Inr/Protime  2016  Inr  1.44  High  0.89-1.11  

 

 CBC Auto Diff  2016  White Blood Count  9.5 10^3/uL    3.5-10.8  









 Red Blood Count  3.93 10^6/uL  Low  4.0-5.4  

 

 Hemoglobin  12.8 g/dL  Low  14.0-18.0  

 

 Hematocrit  38 %  Low  42-52  

 

 Mean Corpuscular Volume  98 fL  High  80-94  

 

 Mean Corpuscular Hemoglobin  33 pg  High  27-31  

 

 Mean Corpuscular HGB Conc  33 g/dL    31-36  

 

 Red Cell Distribution Width  12 %    10.5-15  

 

 Platelet Count  135 10^3/uL  Low  150-450  

 

 Mean Platelet Volume  8 um3    7.4-10.4  

 

 Abs Neutrophils  8.0 10^3/uL  High  1.5-7.7  

 

 Abs Lymphocytes  0.8 10^3/uL  Low  1.0-4.8  

 

 Abs Monocytes  0.6 10^3/uL    0-0.8  

 

 Abs Eosinophils  0 10^3/uL    0-0.6  

 

 Abs Basophils  0 10^3/uL    0-0.2  

 

 Abs Nucleated RBC  0 10^3/uL      

 

 Granulocyte %  84.3 %  High  38-83  

 

 Lymphocyte %  8.9 %  Low  25-47  

 

 Monocyte %  5.9 %    1-9  

 

 Eosinophil %  0.5 %    0-6  

 

 Basophil %  0.4 %    0-2  

 

 Nucleated Red Blood Cells %  0      









 Protime W/ Inr  01/15/2016  Prothrombin Time  13.9      









 Inr  1.2      









 CBC Auto Diff  01/15/2016  White Blood Count  5.7 10^3/uL    3.5-10.8  









 Red Blood Count  4.83 10^6/uL    4.0-5.4  

 

 Hemoglobin  15.5 g/dL    14.0-18.0  

 

 Hematocrit  47 %    42-52  

 

 Mean Corpuscular Volume  97 fL  High  80-94  

 

 Mean Corpuscular Hemoglobin  32 pg  High  27-31  

 

 Mean Corpuscular HGB Conc  33 g/dL    31-36  

 

 Red Cell Distribution Width  12 %    10.5-15  

 

 Platelet Count  138 10^3/uL  Low  150-450  

 

 Mean Platelet Volume  8 um3    7.4-10.4  

 

 Abs Neutrophils  4.3 10^3/uL    1.5-7.7  

 

 Abs Lymphocytes  1.0 10^3/uL    1.0-4.8  

 

 Abs Monocytes  0.4 10^3/uL    0-0.8  

 

 Abs Eosinophils  0.1 10^3/uL    0-0.6  

 

 Abs Basophils  0 10^3/uL    0-0.2  

 

 Abs Nucleated RBC  0 10^3/uL      

 

 Granulocyte %  74.2 %    38-83  

 

 Lymphocyte %  16.9 %  Low  25-47  

 

 Monocyte %  7.0 %    1-9  

 

 Eosinophil %  1.3 %    0-6  

 

 Basophil %  0.6 %    0-2  

 

 Nucleated Red Blood Cells %  0.1      









 Lipid Profile (Trig/Chol/HDL)  2016  Triglycerides  75 mg/dL      53









 Cholesterol  132 mg/dL      54

 

 HDL Cholesterol  45.2 mg/dL      55

 

 LDL Cholesterol  72 mg/dL      56









 Comp Metabolic Panel  2016  Sodium  138 mmol/L    133-145  









 Potassium  4.1 mmol/L    3.5-5.0  

 

 Chloride  103 mmol/L    101-111  

 

 Co2 Carbon Dioxide  30 mmol/L    22-32  

 

 Anion Gap  5 mmol/L    2-11  

 

 Glucose  87 mg/dL      

 

 Blood Urea Nitrogen  18 mg/dL    6-24  

 

 Creatinine  1.14 mg/dL    0.67-1.17  

 

 BUN/Creatinine Ratio  15.8    8-20  

 

 Calcium  9.0 mg/dL    8.6-10.3  

 

 Total Protein  6.6 g/dL    6.4-8.9  

 

 Albumin  4.4 g/dL    3.2-5.2  

 

 Globulin  2.2 g/dL    2-4  

 

 Albumin/Globulin Ratio  2.0    1-3  

 

 Total Bilirubin  1.00 mg/dL    0.2-1.0  

 

 Alkaline Phosphatase  70 U/L      

 

 Alt  28 U/L    7-52  

 

 Ast  31 U/L    13-39  

 

 Egfr Non-  64.3    >60  

 

 Egfr   82.7    >60  57









 Protein Electrophoresis  2016  Total Protein(Pep)  6.8 g/dL    6.3 - 7.9
  









 Albumin  3.8 g/dL    3.4-4.7  

 

 Alpha-1 Globulin  0.2 g/dL    0.1-0.3  

 

 Alpha-2 Globulin  0.6 g/dL    0.6-1.0  

 

 Beta Globulin  1.1 g/dL    0.7-1.2  

 

 Gamma Globulin  1.1 g/dL    0.6-1.6  

 

 Albumin/Globulin Ratio  1.26      

 

 Impression  See Comment      58









 Laboratory test finding  2016  Vitamin B12  290 pg/mL    180-914  59









 TSH (Thyroid Stim Horm)  2.04 ?IU/mL    0.34-5.60  

 

 Hepatitis C Antibody  Nonreactive    Nonreactive  









 Protime W/ Inr  2015  Prothrombin Time  31.3      









 Inr  2.6q      









 Protime W/ Inr  2015  Prothrombin Time  39.7      









 Inr  3.3      









 Protime W/ Inr  2015  Prothrombin Time  33.7      









 Inr  2.8      









 Protime W/ Inr  10/28/2015  Prothrombin Time  36.2      









 Inr  3.0      









 Protime W/ Inr  10/21/2015  Prothrombin Time  26.1      









 Inr  2.2      









 Protime W/ Inr  10/14/2015  Prothrombin Time  45.1      









 Inr  3.8      









 Protime W/ Inr  2015  Prothrombin Time  40.1      









 Inr  3.3      









 Protime W/ Inr  2015  Prothrombin Time  41.3      









 Inr  3.4      









 Protime W/ Inr  2015  Prothrombin Time  30.6      









 Inr  2.5      









 Protime W/ Inr  2015  Prothrombin Time  29.9      









 Inr  2.5      









 Protime W/ Inr  2015  Prothrombin Time  33.0      









 Inr  2.8      









 Protime W/ Inr  2015  Prothrombin Time  39.8      









 Inr  3.3      









 Protime W/ Inr  2015  Prothrombin Time  29.6      









 Inr  2.5      









 Protime W/ Inr  2015  Prothrombin Time  41.9      









 Inr  3.5      









 Protime W/ Inr  2015  Prothrombin Time  28.8      









 Inr  2.4      









 Protime W/ Inr  2015  Prothrombin Time  40.8      









 Inr  3.5      









 Protime W/ Inr  2015  Prothrombin Time  33.5      









 Inr  2.8      









 Ua Routine  2015  Ua Specific Gravity  1.010      









 Ua PH  5.0      

 

 Ua Color  yellow      

 

 Ua Appera  clear      

 

 Ua WBC  neg      

 

 Ua Protein  neg      

 

 Ua Glucose  neg      

 

 Ua Ketones  neg      

 

 Ua Bilirubin  neg      

 

 Ua Urobilinogen  0.2      

 

 Ua Nitrite  neg      

 

 Ua Occult Blood  neg      









 Inr/Protime  2015  Inr  3.47  High  0.85-1.06  

 

 Protime W/ Inr  2014  Inr  2.28  High  0.85-1.06  

 

 Lipid Profile (Trig/Chol/HDL)  2014  Triglycerides  60 mg/dL      60









 Cholesterol  117 mg/dL      61

 

 HDL Cholesterol  40.7 mg/dL      62

 

 LDL Cholesterol  64 mg/dL      63









 Comp Metabolic Panel  2014  Sodium  140 mmol/L    133-145  









 Potassium  4.3 mmol/L    3.5-5.0  

 

 Chloride  107 mmol/L    101-111  

 

 Co2 Carbon Dioxide  30 mmol/L    22-32  

 

 Anion Gap  3 mmol/L    2-11  

 

 Glucose  76 mg/dL      

 

 Blood Urea Nitrogen  15 mg/dL    6-24  

 

 Creatinine  0.99 mg/dL    0.67-1.17  

 

 BUN/Creatinine Ratio  15.2    8-20  

 

 Calcium  8.5 mg/dL  Low  8.6-10.3  

 

 Total Protein  6.0 g/dL  Low  6.4-8.9  

 

 Albumin  4.1 g/dL    3.2-5.2  

 

 Globulin  1.9 g/dL  Low  2-4  

 

 Albumin/Globulin Ratio  2.2    1-3  

 

 Total Bilirubin  0.80 mg/dL    0.2-1.0  

 

 Alkaline Phosphatase  73 U/L      

 

 Alt  27 U/L    7-52  

 

 Ast  33 U/L    13-39  

 

 Egfr Non-  76.1    >60  

 

 Egfr   97.9    >60  64









 Laboratory test finding  2014  Inr  2.73  High  0.85-1.06  

 

 Laboratory test finding  2014  Inr  2.51  High  0.85-1.06  

 

 Laboratory test finding  10/30/2014  Inr  2.93  High  0.85-1.06  

 

 Laboratory test finding  10/20/2014  Inr  2.31  High  0.85-1.06  

 

 Laboratory test finding  10/10/2014  Inr  3.90  High  0.85-1.06  

 

 Laboratory test finding  2014  Inr  3.82  High  0.85-1.06  

 

 Laboratory test finding  2014  Inr  3.36  High  0.85-1.06  

 

 Laboratory test finding  2014  Inr  3.00  High  0.85-1.06  

 

 Inr/Protime  2014  Inr  2.63  High  0.85-1.06  

 

 Laboratory test finding  2014  Inr  3.01  High  0.85-1.06  

 

 Laboratory test finding  2014  Inr  3.10  High  0.85-1.06  

 

 Laboratory test finding  2014  Inr  2.38  High  0.85-1.06  

 

 Laboratory test finding  2014  Inr  2.79  High  0.85-1.06  

 

 Laboratory test finding  2014  Inr  3.49  High  0.85-1.06  

 

 Inr/Protime  2013  Inr  3.45  High  0.85-1.06  65

 

 Laboratory test finding  2013  Inr  3.80  High  0.85-1.06  66

 

 Laboratory test finding  2013  PSA Diagnostic  0.50 ng/mL    0-4.0  67

 

 Laboratory test finding  2013  Inr  3.57  High  0.87-0.97  

 

 Laboratory test finding  2013  Inr  2.09  High  0.87-0.97  

 

 Laboratory test finding  2013  Inr  3.97  High  0.87-0.97  

 

 Laboratory test finding  2013  Inr  2.67  High  0.87-0.97  

 

 Laboratory test finding  2013  Inr  2.71  High  0.87-0.97  

 

 Laboratory test finding  2013  Inr  3.45  High  0.87-0.97  

 

 Lipid Profile (Trig/Chol/HDL)  2013  Triglycerides  71 mg/dL      









 Cholesterol  126 mg/dL    Less than 200  

 

 HDL Cholesterol  43 mg/dL    40-60  68

 

 Cholesterol/HDL Ratio  2.9 Average    1-4.44  

 

 LDL Cholesterol  68.8 mg/dL    Less Than 100  69









 Comp Metabolic Panel  2013  Sodium  141 mmol/L    133-145  









 Potassium  4.3 mmol/L    3.5-5.0  

 

 Chloride  107 mmol/L    101-111  

 

 Co2 Carbon Dioxide  28.0 mmol/L    22-32  

 

 Anion Gap  6.0 mmol/L    2-11  

 

 Glucose  79 mg/dL      

 

 Blood Urea Nitrogen  12 mg/dL    6-24  

 

 Creatinine  1.00 mg/dL    0.50-1.40  

 

 BUN/Creatinine Ratio  12.0    8-20  

 

 Calcium  9.4 mg/dL    8.1-9.9  

 

 Total Protein  5.8 g/dL  Low  6.2-8.1  

 

 Albumin  4.0 g/dL    3.2-5.2  

 

 Globulin  1.8 g/dL  Low  2-4  

 

 Albumin/Globulin Ratio  2.2    1-3  

 

 Total Bilirubin  1.2 mg/dL    0.4-1.5  

 

 Alkaline Phosphatase  72 U/L      

 

 Alt  32 U/L    14-54  

 

 Ast  40 U/L    12-42  

 

 Egfr Non-  75.5    >60  

 

 Egfr   97.1    >60  70









 Laboratory test finding  2013  Inr  3.02  High  0.87-0.97  

 

 Laboratory test finding  2013  Inr  2.84  High  0.87-0.97  71

 

 Laboratory test finding  2013  Inr  2.53  High  0.87-0.97  72

 

 Laboratory test finding  2013  Inr  2.61  High  0.82-1.17  73

 

 Laboratory test finding  2012  Inr  3.58  High  0.82-1.17  74

 

 Laboratory test finding  2012  Inr  3.45  High  0.82-1.17  75

 

 Laboratory test finding  10/03/2012  Inr  2.26  High  0.82-1.17  76

 

 International Normalized Ratio  2012  Inr  2.69  High  0.88-1.13  77









 Protime  33.4 SEC  High  10.3-13.5  78









 International Normalized Ratio  2012  Inr  3.18  High  0.88-1.13  79









 Protime  39.7 SEC  High  10.3-13.5  80









 Creatinine  2012  Creatinine  1.0 mg/dL    0.50-1.40  









 One Over Creatinine  1.00      

 

 eGFR Non-  75.7    > 60  

 

 eGFR   97.4    > 60  81









 CBC No Diff  2012  White Blood Count  4.6 CUMM  Low  4.8-10.8  









 Red Cell Count  4.64 CUMM    4.6-6.2  

 

 Hemoglobin  15.2 g/dL    14.0-18.0  

 

 Hematocrit  44 %    42-52  

 

 Mean Corpuscular Volume  94 um3    80-94  

 

 Mean Corpuscular Hemoglob  33 pg  High  27-31  

 

 Mean Corpuscular HGB Cone  35 g/dL    32-36  

 

 Redcell Distribution WDTH  13 %    10.5-15  

 

 Platelet Count  147 CUMM  Low  150-450  

 

 Mean Platelet Volume  8.2 um3    7.4-10.4  









 Protime  2012  Inr  3.12  High  0.88-1.13  82









 Protime  38.9 SEC  High  10.3-13.5  83









 Laboratory test finding  2012  PTT (Aptt)  38.7 SEC  High  25.1-38.5  

 

 Basic Metabolic Panel  2012  Sodium  140 mmol/L    135-145  









 Potassium  4.2 mmol/L    3.5-5.0  

 

 Chloride  107 mmol/L    101-111  

 

 Co2 (Carbon Dioxide)  29.0 mmol/L    22-32  

 

 Anion Gap  4.0 mmol/L    2-11  84

 

 Glucose  97 mg/dL      

 

 BUN  16 mg/dL    6-24  

 

 Creatinine  1.1 mg/dL    0.50-1.40  

 

 One Over Creatinine  0.90      

 

 BUN/Creatinine Ratio  14.5    8-20  

 

 Calcium  9.2 mg/dL    8.1-9.9  

 

 eGFR Non-  67.8    > 60  

 

 eGFR   87.2    > 60  85









 International Normalized Ratio  2012  Inr  1.95  High  0.88-1.13  86









 Protime  23.9 SEC  High  10.3-13.5  87









 Laboratory test finding  2012  CPK (Creatine Kinase)  316 U/L  High  0-
200  

 

 International Normalized  2012  Inr  2.68  High  0.88-1.13  88



 Ratio            









 Protime  32.9 SEC  High  10.3-13.5  89









 International Normalized Ratio  2012  Inr  3.59  High  0.88-1.13  90









 Protime  44.7 SEC  High  10.3-13.5  91









 International Normalized Ratio  2012  Inr  1.60  High  0.88-1.13  92









 Protime  19.3 SEC  High  10.3-13.5  93









 Surgical Pathology  2012  Surgical  ---------------- <SEE      94



     Pathology  NOTE>      

 

 International  2012  Inr  2.34  High  0.88-1.  95



 Normalized Ratio          13  









 Protime  28.6 SEC  High  10.3-13.5  96









 Lipid Profile (Trig/Chol/HDL)  2012  Triglyceride  87 mg/dL      









 Cholesterol  145 mg/dL    Less Than 200  97

 

 High Density Lipoprotein  43 mg/dL    40-60  98

 

 Cholesterol/HDL Ratio  3.37 AVERAGE    1-4.97  

 

 Low Density Lipoprotein  85 mg/dL    Less Than 100  99









 Laboratory test finding  2012  Ast (Sgot)  27 U/L    12-42  









 CPK (Creatine Kinase)  203 U/L  High  0-200  









 International Normalized Ratio  10/31/2011  Inr  2.73  High  0.88-1.13  100









 Protime  33.6 SEC  High  10.3-13.5  101









 International Normalized Ratio  2011  Inr  2.84  High  0.82-1.17  102









 Protime  35.4 SEC  High  10.2-14.8  103









 International Normalized Ratio  2011  Inr  1.89  High  0.82-1.17  104









 Protime  23.1 SEC  High  10.2-14.8  105









 Lipid Profile (Trig/Chol/HDL)  2011  Triglyceride  155 mg/dL      









 Cholesterol  185 mg/dL    Less Than 200  106

 

 High Density Lipoprotein  39 mg/dL  Low  40-60  107

 

 Cholesterol/HDL Ratio  4.74 AVERAGE    1-4.97  

 

 Low Density Lipoprotein  115 mg/dL  High  Less Than 100  108









 Comp Metabolic Panel  2011  Sodium  140 mmol/L    135-145  









 Potassium  3.9 mmol/L    3.5-5.0  

 

 Chloride  107 mmol/L    101-111  

 

 Co2 (Carbon Dioxide)  28.0 mmol/L    22-32  

 

 Anion Gap  5.0 mmol/L    2-11  109

 

 Glucose  80 mg/dL      

 

 BUN  10 mg/dL    6-24  

 

 Creatinine  1.10 mg/dL    0.50-1.40  

 

 One Over Creatinine  0.90      

 

 BUN/Creatinine Ratio  9.1    8-20  

 

 Calcium  8.9 mg/dL    8.1-9.9  

 

 Total Protein  6.3 GM/DL    6.2-8.1  

 

 Albumin  4.0 GM/DL    3.2-5.2  

 

 Globulin  2.3 GM/DL    2-4  

 

 Albumin/Globulin Ratio  1.7    1-3  

 

 Bilirubin Total  0.9 mg/dL    0.4-1.5  110

 

 Alkaline Phosphatase  74 U/L      

 

 Alt (SGPT)  14 U/L  Low  17-63  

 

 Ast (Sgot)  21 U/L    12-42  

 

 eGFR Non-  68.1    > 60  

 

 eGFR   87.5    > 60  111









 CBC With Manual Diff  2011  White Blood Count  4.7 CUMM  Low  4.8-10.8  









 Red Cell Count  4.67 CUMM    4.6-6.2  

 

 Hemoglobin  14.0 g/dL    14.0-18.0  

 

 Hematocrit  41 %  Low  42-52  

 

 Mean Corpuscular Volume  87 um3    80-94  

 

 Mean Corpuscular Hemoglob  30 pg    27-31  

 

 Mean Corpuscular HGB Cone  35 g/dL    32-36  

 

 Redcell Distribution WDTH  14 %    10.5-15  

 

 Platelet Count  139 CUMM  Low  150-450  

 

 Mean Platelet Volume  8.6 um3    7.4-10.4  

 

 Polysegmented Neutrophil  66 %    38-83  

 

 Lymphocyte  25 %    25-47  

 

 Monocyte  6 %    0-13  

 

 Eosinophil  2 %    0-6  

 

 Basophil  1 %    0-2  

 

 Absolute Neutrophil Count  3.1      

 

 RBC Morphology  NORMAL      









 International Normalized Ratio  2011  Inr  2.38  High  0.82-1.17  112









 Protime  29.4 SEC  High  10.2-14.8  113









 International Normalized Ratio  2011  Inr  3.95  High  0.82-1.17  114









 Protime  50.1 SEC  High  10.2-14.8  115









 International Normalized Ratio  2011  Inr  3.86  High  0.82-1.17  116









 Protime  48.9 SEC  High  10.2-14.8  117









 International Normalized Ratio  2011  Inr  2.95  High  0.82-1.17  118









 Protime  36.8 SEC  High  10.2-14.8  119









 1  STANDING ORDER  ENTERED 17  EXPIRES 18  NON-FASTING

 

 2  [id number]

 

 3  RESULT: No apparent monoclonal protein on serum electrophoresis.



   Test Performed by:



   [facility]



   [address] [place] [address]

 

 4  Normal Range 180 to 914



   Indeterminate Range 145 to 180



   Deficient Range  <145

 

 5  -------------------ADDITIONAL INFORMATION-------------------



   This test has been modified from the 's



   instructions. Its performance characteristics were



   determined by [facility] in a manner consistent with



   CLIA requirements. This test has not been cleared or



   approved by the U.S. Food and Drug Administration.



   Test Performed by:



   [facility] [address] [address]

 

 6  -------------------ADDITIONAL INFORMATION-------------------



   This test has been modified from the 's



   instructions. Its performance characteristics were



   determined by [facility] in a manner consistent with



   CLIA requirements. This test has not been cleared or



   approved by the U.S. Food and Drug Administration.

 

 7  -------------------ADDITIONAL INFORMATION-------------------



   This test has been modified from the 's



   instructions. Its performance characteristics were



   determined by [facility] in a manner consistent with



   CLIA requirements. This test has not been cleared or



   approved by the U.S. Food and Drug Administration.

 

 8  -------------------ADDITIONAL INFORMATION-------------------



   This test has been modified from the 's



   instructions. Its performance characteristics were



   determined by [facility] in a manner consistent with



   CLIA requirements. This test has not been cleared or



   approved by the U.S. Food and Drug Administration.

 

 9  No laboratory evidence of von Willebrand's disease based on



   normal results of factor VIII activity, von Willebrand



   factor activity, and von Willebrand factor antigen.  Note:



   von Willebrand factor antigen and activity and/or factor



   VIII may be increased above baseline levels by acute or



   chronic inflammation, stress or adrenergic stimuli,



   pregnancy or estrogen and oral contraceptive therapy, or



   liver disease.  Recent administration of desmopressin or



   von Willebrand factor concentrate may mask the diagnosis of



   von Willebrand's disease.  Suggest clinical correlation.



   Interpretation not reviewed by physician.



   Test Performed by:



   [place] [address]

 

 10  Clot lysis was not observed in the qualitative factor XIII



   screening test. This pattern suggests that severe factor



   XIII deficiency is not present. However, this test does not



   identify mild or moderate deficiency, treated deficient



   patients, or patients with weak inhibitors that do not



   decrease factor XIII activity to less than 1% of normal.



   In this test, clot lysis only occurs in samples with severe



   factor XIII deficiency (less than approximately 1% of



   normal activity). Severe deficiency may be inherited or



   acquired (typically due to a factor XIII antibody).



   Performed by [facility],



   [address], [place] [id number] [phone]



   [email or web address], Xu Ellison MD - Lab. Director



   Test Performed by:



   [facility]



   [address] [place] [address]

 

 11  Please note the change in INR reference range effective



   17.

 

 12  Desirable: <150



   Borderline High: 150-199



   High: 200-499



   Very High: >500

 

 13  Desirable: <200



   Borderline High: 200-239



   High: >239

 

 14  Low: <40



   Desirable: 40-60



   High: >60

 

 15  Desirable: <100



   Near Optimal: 100-129



   Borderline High: 130-159



   High: 160-189



   Very High: >189

 

 16  *******Because ethnic data is not always readily available,



   this report includes an eGFR for both -Americans and



   non- Americans.****



   The National Kidney Disease Education Program (NKDEP) does



   not endorse the use of the MDRD equation for patients that



   are not between the ages of 18 and 70, are pregnant, have



   extremes of body size, muscle mass, or nutritional status,



   or are non- or non-.



   According to the National Kidney Foundation, irrespective of



   diagnosis, the stage of the disease is based on the level of



   kidney function:



   Stage Description                      GFR(mL/min/1.73 m(2))



   1     Kidney damage with normal or decreased GFR       90



   2     Kidney damage with mild decrease in GFR          60-89



   3     Moderate decrease in GFR                         30-59



   4     Severe decrease in GFR                           15-29



   5     Kidney failure                       <15 (or dialysis)

 

 17  Serum levels of PSA measured using the [employer]



   DXI Hybritech immunoassay should not be interpreted as



   absolute evidence of the presence or absence of disease.



   The PSA value should be used in conjunction with other



   pertinent clinical diagnostic procedures.



   



   The values obtained with different assay methods or kits



   cannot be used interchangeably.

 

 18  -------------------REFERENCE VALUE--------------------------



   0.0-0.9



   <10 (MoM implant)



   -------------------ADDITIONAL INFORMATION-------------------



   This test was developed and its performance characteristics



   determined by [facility] in a manner consistent with CLIA



   requirements. This test has not been cleared or approved by



   the U.S. Food and Drug Administration.



   Test Performed by:



   [facility]



   [address] [place] [address]

 

 19  -------------------ADDITIONAL INFORMATION-------------------



   This test was developed and its performance characteristics



   determined by [facility] in a manner consistent with CLIA



   requirements. This test has not been cleared or approved by



   the U.S. Food and Drug Administration.



   Test Performed by:



   [facility]



   [address] [place] [address]

 

 20  Normal Range 180 to 914



   Indeterminate Range 145 to 180



   Deficient Range  <145

 

 21  *******Because ethnic data is not always readily available,



   this report includes an eGFR for both -Americans and



   non- Americans.****



   The National Kidney Disease Education Program (NKDEP) does



   not endorse the use of the MDRD equation for patients that



   are not between the ages of 18 and 70, are pregnant, have



   extremes of body size, muscle mass, or nutritional status,



   or are non- or non-.



   According to the National Kidney Foundation, irrespective of



   diagnosis, the stage of the disease is based on the level of



   kidney function:



   Stage Description                      GFR(mL/min/1.73 m(2))



   1     Kidney damage with normal or decreased GFR       90



   2     Kidney damage with mild decrease in GFR          60-89



   3     Moderate decrease in GFR                         30-59



   4     Severe decrease in GFR                           15-29



   5     Kidney failure                       <15 (or dialysis)

 

 22  Acute inflammation:  >10.00

 

 23  Copy Result to: KINJAL TURCIOS ([id number])

 

 24  Serologic response to B. burgdorferi infection is not



   detected, but cannot rule out early infection during



   which low or undetectable antibody levels to



   B. burgdorferi may be present. If clinically indicated,



   a new serum specimen should be submitted in 7-14 days.



   Test Performed by:



   [facility]



   [address] [place] [address]

 

 25  Copy Result to: SHERYL HERNANDEZ ([id number])

 

 26  Copy Result to: SHERYL HERNANDEZ ([id number])

 

 27  *******Because ethnic data is not always readily available,



   this report includes an eGFR for both -Americans and



   non- Americans.****



   The National Kidney Disease Education Program (NKDEP) does



   not endorse the use of the MDRD equation for patients that



   are not between the ages of 18 and 70, are pregnant, have



   extremes of body size, muscle mass, or nutritional status,



   or are non- or non-.



   According to the National Kidney Foundation, irrespective of



   diagnosis, the stage of the disease is based on the level of



   kidney function:



   Stage Description                      GFR(mL/min/1.73 m(2))



   1     Kidney damage with normal or decreased GFR       90



   2     Kidney damage with mild decrease in GFR          60-89



   3     Moderate decrease in GFR                         30-59



   4     Severe decrease in GFR                           15-29



   5     Kidney failure                       <15 (or dialysis)

 

 28  SEE RESULT BELOW



   -----------------------------------------------------------------------------
---------------



   Name:  FAWAD JOHNSON                   : 1950    Attend Dr: Suresh Harper MD



   Acct:  [id number]  Unit: [id number]  AGE: 67            Location:  ENDO



   Re17                        SEX: M             Status:    DEP REF



   -----------------------------------------------------------------------------
---------------



   



   SPEC: [id number]             SHARLA:       [facility] DR: Suresh Harper MD



   REQ:  [id number]             RECD: [id number]



   STATUS: DEBBY GREENE DR: Kinjal Turcios MD



   _



   ORDERED:  LEVEL 4



   



   FINAL DIAGNOSIS



   



   



   Colon, at 80 cm, biopsy:



   -- Tubular adenoma.



   -- No high grade dysplasia or malignancy.



   



   



   



   CLINICAL HISTORY



   



   No history given



   



   POST-OPERATIVE DIAGNOSIS



   



   Colonoscopy to terminal ileum - polyp at 80 cm snared



   



   GROSS DESCRIPTION



   



   The specimen is received in formalin labeled, Colon Polyp at 80 cm, and 
consists of a 0.5 x



   0.5 by up to 0.3 cm aggregate of tan-pink irregular to polypoid soft tissue 
fragments which



   is submitted entirely in one cassette.



   



   Signed __________(signature on file)___________ Zahra Rhoades MD  [id number]



   



   -----------------------------------------------------------------------------
---------------



   



   



   



   



   



   



   



   



   



   



   



   ** END OF REPORT **



   



   * ML=Testing performed at Main Lab



   DEPARTMENT OF PATHOLOGY,  [address] [place] [address]



   Phone # [phone]      Fax #[phone]



   Romulo Ennis M.D. Director     [facility] # [id number]

 

 29  *******Because ethnic data is not always readily available,



   this report includes an eGFR for both -Americans and



   non- Americans.****



   The National Kidney Disease Education Program (NKDEP) does



   not endorse the use of the MDRD equation for patients that



   are not between the ages of 18 and 70, are pregnant, have



   extremes of body size, muscle mass, or nutritional status,



   or are non- or non-.



   According to the National Kidney Foundation, irrespective of



   diagnosis, the stage of the disease is based on the level of



   kidney function:



   Stage Description                      GFR(mL/min/1.73 m(2))



   1     Kidney damage with normal or decreased GFR       90



   2     Kidney damage with mild decrease in GFR          60-89



   3     Moderate decrease in GFR                         30-59



   4     Severe decrease in GFR                           15-29



   5     Kidney failure                       <15 (or dialysis)

 

 30  Desirable <150



   Borderline high 150-199



   High 200-499



   Very High >500

 

 31  Desirable <200



   Borderline high 200-239



   High >239

 

 32  Low <40



   Desirable: 40-60



   High: >60

 

 33  Desirable: <100 mg/dL



   Near Optimal: 100-129 mg/dL



   Borderline High: 130-159 mg/dL



   High: 160-189 mg/dL



   Very High: >189 mg/dL

 

 34  *******Because ethnic data is not always readily available,



   this report includes an eGFR for both -Americans and



   non- Americans.****



   The National Kidney Disease Education Program (NKDEP) does



   not endorse the use of the MDRD equation for patients that



   are not between the ages of 18 and 70, are pregnant, have



   extremes of body size, muscle mass, or nutritional status,



   or are non- or non-.



   According to the National Kidney Foundation, irrespective of



   diagnosis, the stage of the disease is based on the level of



   kidney function:



   Stage Description                      GFR(mL/min/1.73 m(2))



   1     Kidney damage with normal or decreased GFR       90



   2     Kidney damage with mild decrease in GFR          60-89



   3     Moderate decrease in GFR                         30-59



   4     Severe decrease in GFR                           15-29



   5     Kidney failure                       <15 (or dialysis)

 

 35  PRN EXP: 16

 

 36  PRN EXP: 16

 

 37  Verbal to GREGG JAMES by [email or web address] at 1259 on 16.



   Results read back accurately.

 

 38  PRN EXP: 16

 

 39  PRN EXP: 16

 

 40  PRN EXP: 16

 

 41  INR weekly and PRN, mechanical AVR.



   Copy Result to: KINJAL TURCIOS ([id number])

 

 42  Verbal to [id number] by [email or web address] at 2222 on 16.



   Results read back accurately.

 

 43  Verbal to [id number] by [id number] at 2222 on 16.



   Results read back accurately.

 

 44  SEE RESULTS BELOW



   [id number]  AP        PC



   TRANSFUSED     16 2320



   [id number]  AP        PC



   TRANSFUSED     16 0040

 

 45  *******Because ethnic data is not always readily available,



   this report includes an eGFR for both -Americans and



   non- Americans.****



   The National Kidney Disease Education Program (NKDEP) does



   not endorse the use of the MDRD equation for patients that



   are not between the ages of 18 and 70, are pregnant, have



   extremes of body size, muscle mass, or nutritional status,



   or are non- or non-.



   According to the National Kidney Foundation, irrespective of



   diagnosis, the stage of the disease is based on the level of



   kidney function:



   Stage Description                      GFR(mL/min/1.73 m(2))



   1     Kidney damage with normal or decreased GFR       90



   2     Kidney damage with mild decrease in GFR          60-89



   3     Moderate decrease in GFR                         30-59



   4     Severe decrease in GFR                           15-29



   5     Kidney failure                       <15 (or dialysis)

 

 46  Acute inflammation:  >10.00

 

 47  Reference Range and Interpretation:



   TnI (ng/mL)             Interpretation



   Less Than 0.03 ng/mL    Not supportive of diagnosis of MI



   0.03 - 0.50 ng/mL       Indeterminate: suggest serial



   studies if clinically indicated.



   Greater than 0.5 ng/mL  Consistent with diagnosis of MI

 

 48  SEE RESULTS BELOW



   [id number]  AP        PC



   TRANSFUSED     16 1658



   [id number]  AP        PC



   TRANSFUSED     16 1658

 

 49  SEE RESULTS BELOW



   [id number]  AP        FFP



   TRANSFUSED     16 1800



   [id number]  AP        FFP



   TRANSFUSED     16 1800

 

 50  *******Because ethnic data is not always readily available,



   this report includes an eGFR for both -Americans and



   non- Americans.****



   The National Kidney Disease Education Program (NKDEP) does



   not endorse the use of the MDRD equation for patients that



   are not between the ages of 18 and 70, are pregnant, have



   extremes of body size, muscle mass, or nutritional status,



   or are non- or non-.



   According to the National Kidney Foundation, irrespective of



   diagnosis, the stage of the disease is based on the level of



   kidney function:



   Stage Description                      GFR(mL/min/1.73 m(2))



   1     Kidney damage with normal or decreased GFR       90



   2     Kidney damage with mild decrease in GFR          60-89



   3     Moderate decrease in GFR                         30-59



   4     Severe decrease in GFR                           15-29



   5     Kidney failure                       <15 (or dialysis)

 

 51  [facility] Severe Sepsis and Septic Shock Management Bundle Measure



   requires all lactic acids initially measuring >2.0mmol/L be



   repeated.

 

 52  >100 to <200 pg/mL: likely compensated congestive heart



   failure (CHF)



   200 to 400 pg/mL: likely moderate CHF



   >400 pg/mL: likely moderate to severe CHF

 

 53  Desirable <150



   Borderline high 150-199



   High 200-499



   Very High >500

 

 54  Desirable <200



   Borderline high 200-239



   High >239

 

 55  Low <40



   Desirable: 40-60



   High: >60

 

 56  Desirable: <100 mg/dL



   Near Optimal: 100-129 mg/dL



   Borderline High: 130-159 mg/dL



   High: 160-189 mg/dL



   Very High: >189 mg/dL

 

 57  *******Because ethnic data is not always readily available,



   this report includes an eGFR for both -Americans and



   non- Americans.****



   The National Kidney Disease Education Program (NKDEP) does



   not endorse the use of the MDRD equation for patients that



   are not between the ages of 18 and 70, are pregnant, have



   extremes of body size, muscle mass, or nutritional status,



   or are non- or non-.



   According to the National Kidney Foundation, irrespective of



   diagnosis, the stage of the disease is based on the level of



   kidney function:



   Stage Description                      GFR(mL/min/1.73 m(2))



   1     Kidney damage with normal or decreased GFR       90



   2     Kidney damage with mild decrease in GFR          60-89



   3     Moderate decrease in GFR                         30-59



   4     Severe decrease in GFR                           15-29



   5     Kidney failure                       <15 (or dialysis)

 

 58  RESULT: No apparent monoclonal protein on serum electrophoresis.



   Test Performed by:



   [place] [id number]



   : Chas Remy II, M.D., Ph.D.

 

 59  Normal Range 180 to 914



   Indeterminate Range 145 to 180



   Deficient Range  <145

 

 60  Desirable <150



   Borderline high 150-199



   High 200-499



   Very High >500

 

 61  Desirable <200



   Borderline high 200-239



   High >239

 

 62  Low <40



   Desirable: 40-60



   High: >60

 

 63  Desirable <100



   Near Optimal 100-129



   Borderline high 130-159



   High 160-189



   Very High >189

 

 64  *******Because ethnic data is not always readily available,



   this report includes an eGFR for both -Americans and



   non- Americans.****



   The National Kidney Disease Education Program (NKDEP) does



   not endorse the use of the MDRD equation for patients that



   are not between the ages of 18 and 70, are pregnant, have



   extremes of body size, muscle mass, or nutritional status,



   or are non- or non-.



   According to the National Kidney Foundation, irrespective of



   diagnosis, the stage of the disease is based on the level of



   kidney function:



   Stage Description                      GFR(mL/min/1.73 m(2))



   1     Kidney damage with normal or decreased GFR       90



   2     Kidney damage with mild decrease in GFR          60-89



   3     Moderate decrease in GFR                         30-59



   4     Severe decrease in GFR                           15-29



   5     Kidney failure                       <15 (or dialysis)

 

 65  Please note the change in the INR reference range effective



   10/31/13.

 

 66  Please note the change in the INR reference range effective



   10/31/13.

 

 67  Serum levels of PSA measured using the DocASAP



   DXI Hybritech immunoassay should not be interpreted as



   absolute evidence of the presence or absence of disease.



   The PSA value should be used in conjunction with other



   pertinent clinical diagnostic procedures.



   



   A PSA value in the range of 0.1 to 0.6 ng/ml is



   indeterminate if being used as an indicator of recurrent or



   residual disease.



   



   The values obtained with different assay methods or kits



   cannot be used interchangeably.

 

 68  HDL Interpretation:



   Undesirable: High Risk:  Less than 40 MG/DL



   Desirable:  Low Risk:  Greater than 60 MG/DL

 

 69  LDL Interpretation:



   Low Risk Optimal Level:  LDL Less than 100 MG/DL



   Near or Above Optimal:  -129 MG/DL



   Borderline High Risk:  -159 MG/DL



   High Risk:  -189 MG/DL



   Very High Risk:  LDL Greater than 189 MG/DL

 

 70  *******Because ethnic data is not always readily available,



   this report includes an eGFR for both -Americans and



   non- Americans.****



   The National Kidney Disease Education Program (NKDEP) does



   not endorse the use of the MDRD equation for patients that



   are not between the ages of 18 and 70, are pregnant, have



   extremes of body size, muscle mass, or nutritional status,



   or are non- or non-.



   According to the National Kidney Foundation, irrespective of



   diagnosis, the stage of the disease is based on the level of



   kidney function:



   Stage Description                      GFR(mL/min/1.73 m(2))



   1     Kidney damage with normal or decreased GFR       90



   2     Kidney damage with mild decrease in GFR          60-89



   3     Moderate decrease in GFR                         30-59



   4     Severe decrease in GFR                           15-29



   5     Kidney failure                       <15 (or dialysis)

 

 71  Please note the change in INR reference range effective



   13.



   



   The INR(International Normalized Ratio) was adopted by the



   World Health Organization (WHO) in 1983 as a standardized



   system of reporting PT (Prothrombin Time).  The Centers for



   Disease Control (CDC) states that reporting of PT results in



   INR only is the preferred method.



   



   Recommended INR for Patients



   on Oral Anticoagulants



   Prophylaxis                       2.0 - 3.0



   Treatment of thrombosis           2.0 - 3.0



   Prevention of embolism            2.0 - 3.0



   Prevention of embolism from



   prosthetic heart valves         2.5 - 3.5

 

 72  Please note the change in INR reference range effective



   13.



   



   The INR(International Normalized Ratio) was adopted by the



   World Health Organization (WHO) in 1983 as a standardized



   system of reporting PT (Prothrombin Time).  The Centers for



   Disease Control (CDC) states that reporting of PT results in



   INR only is the preferred method.



   



   Recommended INR for Patients



   on Oral Anticoagulants



   Prophylaxis                       2.0 - 3.0



   Treatment of thrombosis           2.0 - 3.0



   Prevention of embolism            2.0 - 3.0



   Prevention of embolism from



   prosthetic heart valves         2.5 - 3.5

 

 73  The INR(International Normalized Ratio) was adopted by the



   World Health Organization (WHO) in 1983 as a standardized



   system of reporting PT (Prothrombin Time).  The Centers for



   Disease Control (CDC) states that reporting of PT results in



   INR only is the preferred method.



   



   Recommended INR for Patients



   on Oral Anticoagulants



   Prophylaxis                       2.0 - 3.0



   Treatment of thrombosis           2.0 - 3.0



   Prevention of embolism            2.0 - 3.0



   Prevention of embolism from



   prosthetic heart valves         2.5 - 3.5

 

 74  The INR(International Normalized Ratio) was adopted by the



   World Health Organization (WHO) in 1983 as a standardized



   system of reporting PT (Prothrombin Time).  The Centers for



   Disease Control (CDC) states that reporting of PT results in



   INR only is the preferred method.



   



   Recommended INR for Patients



   on Oral Anticoagulants



   Prophylaxis                       2.0 - 3.0



   Treatment of thrombosis           2.0 - 3.0



   Prevention of embolism            2.0 - 3.0



   Prevention of embolism from



   prosthetic heart valves         2.5 - 3.5

 

 75  Effective 2012, in conjunction with the upgrade



   of the hospital information system, [facility]



   Laboratory will release the International Normalized Ratio



   (INR) only.  Patient reports will no longer contain



   prothrombin time (PT) results in seconds.   This allows for



   consistency in patient evaluation and treatment.



   



   The INR was adopted by the World Health Organization (WHO)



   in 1983 as a standardized system of reporting PT.  The



   Centers for Disease Control (CDC) states that reporting of



   PT results in INR only is the preferred method.



   



   Recommended INR for Patients



   on Oral Anticoagulants



   Prophylaxis                       2.0 - 3.0



   Treatment of thrombosis           2.0 - 3.0



   Prevention of embolism            2.0 - 3.0



   Prevention of embolism from



   prosthetic heart valves         2.5 - 3.5

 

 76  Recommended INR for Patients



   on Oral Anticoagulants



   Prophylaxis                       2.0 - 3.0



   Treatment of thrombosis           2.0 - 3.0



   Prevention of embolism            2.0 - 3.0



   Prevention of embolism from



   prosthetic heart valves         2.5 - 3.5

 

 77  Recommended INR for Patients



   on Oral Anticoagulants



   Prophylaxis                       2.0 - 3.0



   Treatment of thrombosis           2.0 - 3.0



   Prevention of embolism            2.0 - 3.0



   Prevention of embolism from



   prosthetic heart valves         2.5 - 3.5

 

 78  DIAGNOSIS,TREATMENT,AND THERAPY MUST BE BASED ON THE INR



   VALUE ALONE.

 

 79  Recommended INR for Patients



   on Oral Anticoagulants



   Prophylaxis                       2.0 - 3.0



   Treatment of thrombosis           2.0 - 3.0



   Prevention of embolism            2.0 - 3.0



   Prevention of embolism from



   prosthetic heart valves         2.5 - 3.5

 

 80  DIAGNOSIS,TREATMENT,AND THERAPY MUST BE BASED ON THE INR



   VALUE ALONE.

 

 81  *******Because ethnic data is not always readily available,



   this report includes an eGFR for both -Americans and



   non- Americans.****



   The National Kidney Disease Education Program (NKDEP) does



   not endorse the use of the MDRD equation for patients that



   are not between the ages of 18 and 70, are pregnant, have



   extremes of body size, muscle mass, or nutritional status,



   or are non- or non-.



   According to the National Kidney Foundation, irrespective of



   diagnosis, the stage of the disease is based on the level of



   kidney function:



   Stage Description                      GFR(mL/min/1.73 m(2))



   1     Kidney damage with normal or decreased GFR       90



   2     Kidney damage with mild decrease in GFR          60-89



   3     Moderate decrease in GFR                         30-59



   4     Severe decrease in GFR                           15-29



   5     Kidney failure                       <15 (or dialysis)

 

 82  Recommended INR for Patients



   on Oral Anticoagulants



   Prophylaxis                       2.0 - 3.0



   Treatment of thrombosis           2.0 - 3.0



   Prevention of embolism            2.0 - 3.0



   Prevention of embolism from



   prosthetic heart valves         2.5 - 3.5

 

 83  DIAGNOSIS,TREATMENT,AND THERAPY MUST BE BASED ON THE INR



   VALUE ALONE.

 

 84  Anion gap measurement may be of limited value in the



   presence of any alkalosis, especially in a combined acid



   base disorder.



   .

 

 85  *******Because ethnic data is not always readily available,



   this report includes an eGFR for both -Americans and



   non- Americans.****



   The National Kidney Disease Education Program (NKDEP) does



   not endorse the use of the MDRD equation for patients that



   are not between the ages of 18 and 70, are pregnant, have



   extremes of body size, muscle mass, or nutritional status,



   or are non- or non-.



   According to the National Kidney Foundation, irrespective of



   diagnosis, the stage of the disease is based on the level of



   kidney function:



   Stage Description                      GFR(mL/min/1.73 m(2))



   1     Kidney damage with normal or decreased GFR       90



   2     Kidney damage with mild decrease in GFR          60-89



   3     Moderate decrease in GFR                         30-59



   4     Severe decrease in GFR                           15-29



   5     Kidney failure                       <15 (or dialysis)

 

 86  Recommended INR for Patients



   on Oral Anticoagulants



   Prophylaxis                       2.0 - 3.0



   Treatment of thrombosis           2.0 - 3.0



   Prevention of embolism            2.0 - 3.0



   Prevention of embolism from



   prosthetic heart valves         2.5 - 3.5

 

 87  DIAGNOSIS,TREATMENT,AND THERAPY MUST BE BASED ON THE INR



   VALUE ALONE.

 

 88  Recommended INR for Patients



   on Oral Anticoagulants



   Prophylaxis                       2.0 - 3.0



   Treatment of thrombosis           2.0 - 3.0



   Prevention of embolism            2.0 - 3.0



   Prevention of embolism from



   prosthetic heart valves         2.5 - 3.5

 

 89  DIAGNOSIS,TREATMENT,AND THERAPY MUST BE BASED ON THE INR



   VALUE ALONE.

 

 90  Recommended INR for Patients



   on Oral Anticoagulants



   Prophylaxis                       2.0 - 3.0



   Treatment of thrombosis           2.0 - 3.0



   Prevention of embolism            2.0 - 3.0



   Prevention of embolism from



   prosthetic heart valves         2.5 - 3.5

 

 91  DIAGNOSIS,TREATMENT,AND THERAPY MUST BE BASED ON THE INR



   VALUE ALONE.

 

 92  Recommended INR for Patients



   on Oral Anticoagulants



   Prophylaxis                       2.0 - 3.0



   Treatment of thrombosis           2.0 - 3.0



   Prevention of embolism            2.0 - 3.0



   Prevention of embolism from



   prosthetic heart valves         2.5 - 3.5

 

 93  DIAGNOSIS,TREATMENT,AND THERAPY MUST BE BASED ON THE INR



   VALUE ALONE.

 

 94  ---------------------------------------------------------------------------
----



   -------------



   



   RUN DATE: 12               [facility] NMI **LIVE**



   PAGE 1



   RUN TIME: 1400                            Specimen Inquiry



   RUN USER: INTERFACE



   -----------------------------------------------------------------------------
--



   -------------



   



   Name: FAWAD JOHNSON                   Acct#: [id number]      Status: REG REF



   Re12



   Age/Sex: 62/M                         Unit#: [id number]       Location: [facility]. : 50



   -----------------------------------------------------------------------------
--



   -------------



   



   



   Specimen: [id number]    SOUT   Spec Date: 12        Subm Dr: Suresh grant MD



   Spec Type: SURGICAL P          Received:     Copies to:



   Sheryl Blanco MD



   



   



   



   SPECIMEN



   



   1) BIOPSY ASCENDING COLON POLYP  2) REMOVE RECTAL POLYP



   



   HISTORY



   



   POST-OP DIAGNOSIS:    Colonoscopy to cecum. Two polyps - biopsy.



   



   



   CLINICAL INFORMATION:    History of polyps.



   



   



   



   GROSS DESCRIPTION



   



   1) The specimen is received in formalin labelled Fawad Johnson, Biopsy



   Ascending Colon Polyp, and consists of a tan, soft tissue fragment



   measuring 0.3 x 0.3 x 0.2 cm.  Submitted entirely, one cassette.



   



   2) The specimen is received in formalin labelled Fawad Johnson, Rectal



   Polyp, and consists of a tan, soft tissue fragment measuring



   0.5 x 0.3 x 0.2 cm.  Submitted entirely, one cassette.



   



   ******DIAGNOSIS******



   



   1) Colon, ascending, biopsy:



   A.   Tubular adenoma.



   B.   No high grade dysplasia or malignancy.



   



   2) Colon, rectal polyp, biopsy:



   A.   Tubular adenoma.



   B.   No high grade dysplasia or malignancy.



   



   Signed Electronically by: ROMULO ENINS MD 12 [id number]



   



   -----------------------------------------------------------------------------
--



   -------------



   



   



   



   



   



   



   



   



   -----------------------------------------------------------------------------
--



   -------------



   



   DEPARTMENT OF PATHOLOGY, [address] [place] [address]



   Phone #[phone]   Fax #[phone]   [facility] Permit #[id number]



   010



   Romulo Ennis M.D. Director   Curtis Sidhu M.D. Assistant Dir rice



   -----------------------------------------------------------------------------
--



   -------------

 

 95  Recommended INR for Patients



   on Oral Anticoagulants



   Prophylaxis                       2.0 - 3.0



   Treatment of thrombosis           2.0 - 3.0



   Prevention of embolism            2.0 - 3.0



   Prevention of embolism from



   prosthetic heart valves         2.5 - 3.5

 

 96  DIAGNOSIS,TREATMENT,AND THERAPY MUST BE BASED ON THE INR



   VALUE ALONE.

 

 97  CHOLESTEROL INTERPRETATION:



   Desirable:  Less than 200 MG/DL



   Borderline-High Risk:  200-239 MG/DL



   High-Risk:  240 MG/DL and over

 

 98  HDL INTERPRETATION:



   Undesirable: High Risk:  Less than 40 MG/DL



   Desirable:  Low Risk:  Greater than 60 MG/DL

 

 99  LDL INTERPRETATION:



   Low Risk Optimal Level:  LDL Less than 100 MG/DL



   Near or Above Optimal:  -129 MG/DL



   Borderline High Risk:  -159 MG/DL



   High Risk:  -189 MG/DL



   Very High Risk:  LDL Greater than 189 MG/DL

 

 100  Recommended INR for Patients



   on Oral Anticoagulants



   Prophylaxis                       2.0 - 3.0



   Treatment of thrombosis           2.0 - 3.0



   Prevention of embolism            2.0 - 3.0



   Prevention of embolism from



   prosthetic heart valves         2.5 - 3.5

 

 101  DIAGNOSIS,TREATMENT,AND THERAPY MUST BE BASED ON THE INR



   VALUE ALONE.

 

 102  Recommended INR for Patients



   on Oral Anticoagulants



   Prophylaxis                       2.0 - 3.0



   Treatment of thrombosis           2.0 - 3.0



   Prevention of embolism            2.0 - 3.0



   Prevention of embolism from



   prosthetic heart valves         2.5 - 3.5

 

 103  DIAGNOSIS,TREATMENT,AND THERAPY MUST BE BASED ON THE INR



   VALUE ALONE.

 

 104  Recommended INR for Patients



   on Oral Anticoagulants



   Prophylaxis                       2.0 - 3.0



   Treatment of thrombosis           2.0 - 3.0



   Prevention of embolism            2.0 - 3.0



   Prevention of embolism from



   prosthetic heart valves         2.5 - 3.5

 

 105  DIAGNOSIS,TREATMENT,AND THERAPY MUST BE BASED ON THE INR



   VALUE ALONE.

 

 106  CHOLESTEROL INTERPRETATION:



   Desirable:  Less than 200 MG/DL



   Borderline-High Risk:  200-239 MG/DL



   High-Risk:  240 MG/DL and over

 

 107  HDL INTERPRETATION:



   Undesirable: High Risk:  Less than 40 MG/DL



   Desirable:  Low Risk:  Greater than 60 MG/DL

 

 108  LDL INTERPRETATION:



   Low Risk Optimal Level:  LDL Less than 100 MG/DL



   Near or Above Optimal:  -129 MG/DL



   Borderline High Risk:  -159 MG/DL



   High Risk:  -189 MG/DL



   Very High Risk:  LDL Greater than 189 MG/DL

 

 109  Anion gap measurement may be of limited value in the



   presence of any alkalosis, especially in a combined acid



   base disorder.



   .

 

 110  A metabolite of Naproxen, O-desmethylnaproxen, has been



   shown to interfere with the Jendrassik-Longoria method for



   measuring total bilirubin.  Samples from patients who have



   taken Naproxen have shown spurious elevation in total



   bilirubin levels.

 

 111  *******Because ethnic data is not always readily available,



   this report includes an eGFR for both -Americans and



   non- Americans.****



   The National Kidney Disease Education Program (NKDEP) does



   not endorse the use of the MDRD equation for patients that



   are not between the ages of 18 and 70, are pregnant, have



   extremes of body size, muscle mass, or nutritional status,



   or are non- or non-.



   According to the National Kidney Foundation, irrespective of



   diagnosis, the stage of the disease is based on the level of



   kidney function:



   Stage Description                      GFR(mL/min/1.73 m(2))



   1     Kidney damage with normal or decreased GFR       90



   2     Kidney damage with mild decrease in GFR          60-89



   3     Moderate decrease in GFR                         30-59



   4     Severe decrease in GFR                           15-29



   5     Kidney failure                       <15 (or dialysis)

 

 112  Recommended INR for Patients



   on Oral Anticoagulants



   Prophylaxis                       2.0 - 3.0



   Treatment of thrombosis           2.0 - 3.0



   Prevention of embolism            2.0 - 3.0



   Prevention of embolism from



   prosthetic heart valves         2.5 - 3.5

 

 113  DIAGNOSIS,TREATMENT,AND THERAPY MUST BE BASED ON THE INR



   VALUE ALONE.

 

 114  Recommended INR for Patients



   on Oral Anticoagulants



   Prophylaxis                       2.0 - 3.0



   Treatment of thrombosis           2.0 - 3.0



   Prevention of embolism            2.0 - 3.0



   Prevention of embolism from



   prosthetic heart valves         2.5 - 3.5

 

 115  DIAGNOSIS,TREATMENT,AND THERAPY MUST BE BASED ON THE INR



   VALUE ALONE.

 

 116  Recommended INR for Patients



   on Oral Anticoagulants



   Prophylaxis                       2.0 - 3.0



   Treatment of thrombosis           2.0 - 3.0



   Prevention of embolism            2.0 - 3.0



   Prevention of embolism from



   prosthetic heart valves         2.5 - 3.5

 

 117  DIAGNOSIS,TREATMENT,AND THERAPY MUST BE BASED ON THE INR



   VALUE ALONE.

 

 118  Recommended INR for Patients



   on Oral Anticoagulants



   Prophylaxis                       2.0 - 3.0



   Treatment of thrombosis           2.0 - 3.0



   Prevention of embolism            2.0 - 3.0



   Prevention of embolism from



   prosthetic heart valves         2.5 - 3.5

 

 119  DIAGNOSIS,TREATMENT,AND THERAPY MUST BE BASED ON THE INR



   VALUE ALONE.







Procedures







 Date  CPT Code  Description  Status

 

 2018  11003  Nerve Conduction 05-06 Studies  Completed

 

 2018  60990  Stress Test  Completed

 

 2018  81201  Myocardial Perfusion Imaging Tomographic (Spect)  Completed



     Multiple Studies  

 

 2018  98950  ECHO Transthoracic, Real-Time 2D With Doppler And Color  
Completed



     Flow  

 

 2018  21086  ECHO Transthoracic, Real-Time 2D With Doppler And Color  
Completed



     Flow  

 

 2018  33639  EKG Tracing & Interpretation  Completed

 

 2018  32805  EKG Tracing & Interpretation  Completed

 

 2017    Colonoscopy  Completed

 

 2017  76341  ECHO Transthoracic, Real-Time 2D With Doppler And Color  
Completed



     Flow  

 

 2017  21377  EKG Tracing & Interpretation  Completed

 

 2016  02093  EKG Tracing & Interpretation  Completed

 

 2015  46489  EKG Tracing & Interpretation  Completed

 

 2014  26009  EKG Tracing & Interpretation  Completed

 

 2014  77921  ECHO Transthoracic, Real-Time 2D With Doppler And Color  
Completed



     Flow  

 

 2013  55520  EKG Tracing & Interpretation  Completed

 

 2013  81241  Holter Monitoring 24 HR New  Completed

 

 2013  71304  EKG Tracing & Interpretation  Completed

 

 2013  43241  ECHO Transthoracic, Real-Time 2D With Doppler And Color  
Completed



     Flow  

 

 2012  78808  EKG Tracing & Interpretation  Completed

 

 2012    Colonoscopy  Completed

 

 2012    Colonoscopy  Completed

 

 08/10/2011    Diabetic Foot Exam  Completed

 

 2011  51658  EKG Tracing & Interpretation  Completed

 

 2010  11589  EKG Tracing & Interpretation  Completed

 

 2009  65900  EKG Tracing & Interpretation  Completed

 

 2008  74724  EKG Tracing & Interpretation  Completed

 

 2007  54001  EKG Tracing & Interpretation  Completed

 

 2007  94621  EKG Tracing & Interpretation  Completed







Encounters







 Type  Date  Location  Provider  CPT E/M  Dx

 

 Office Visit  2018  4:00p  [facility] Roberta Hernandez M.D.  
[id number]  Z95.2



     [facility]      









 M79.1

 

 I71.2









 Office Visit  2018  2:00p  [facility] Internal  [place],  [id number]  
[id number]



     Medicine  Clayton MILNER    









 R11.0

 

 I71.4

 

 F52.21









 Office Visit  2018  3:40p  [facility] -  Vance Elizabeth NP  
22089  R10.31



     Bryant      

 

 Office Visit  2018  1:30p  [facility] Roberta Hernandez M.D.  
21062  I71.2



     Mariola      









 Q23.1

 

 Z95.2

 

 R06.02

 

 R94.31









 Office Visit  2018  3:00p  [facility]  Salvatore Thompson,  47570  
G60.9



     Services Of Mariola MILNER    









 M62.472

 

 M62.471









 Office Visit  2018  2:00p  [facility]  Kinjal Turcios  85281  
M25.552



     Juana Arnold M.D.    









 M67.431

 

 M25.511









 Office Visit  2018  1:00p  [facility]  Kinjal Turcios  68919  
M25.561



     Juana Arnold M.D.    









 R05

 

 Z96.641









 Office Visit  2018 11:00a  [facility]  Jaclyn Ornelas,  51742  
G60.9



     Services Of Mariola MILNER    









 M16.11









 Office Visit  2018  8:20a  [facility]  Sheryl Hernandez M.D.  
72832  Z95.2



     [facility]      









 Z79.01

 

 Z01.818

 

 Q23.1

 

 I71.2

 

 T84.050D









 Office Visit  2018  8:00a  [facility]  Kinjal Turcios,  75033  Z00.00



     Juana Arnold M.D.    

 

 Office Visit  2017  8:45a  [facility]  Mai Arzate M.D.  
29309  T84.050D



     Of [facility]      









 Z96.641

 

 M25.551









 Office Visit  2017 10:20a  [facility] Osmin Turcios  87579  
M25.551



     Juana Arnold M.D.    









 Z95.2

 

 Z79.01

 

 E78.5

 

 Z12.5









 Office Visit  2017  2:40p  [facility]  Vance Elizabeth NP  99406  
M25.561



     - Bryant      

 

 Office Visit  2017  1:40p  [facility]  Vance Elizabeth NP  66966  
M25.561



     - Bryant      

 

 Office Visit  2017  8:30a  [facility]  Mai Arzate M.D.  
31626  M25.552



     C.M.A.      









 M16.12









 Office Visit  2017 11:40a  [facility]  Kinjal Turcios M.D.
  26457  R05



     - Bryant      









 R53.83

 

 M25.552

 

 E53.9









 Office Visit  2017  8:30a  [facility]  Jaclyn Ornelas,  81705  
G60.9



     Services Of Cma  M.D.    









 R42









 Office Visit  2017  1:40p  [facility]  Terry Steve,  
52866  M79.1



     - Karen Hickey M.D.,FACP    









 A69.20









 Office Visit  2017  9:40a  [facility]  Vance Elizabeth NP  91148  
S00.462A



     - Bryant      

 

 Office Visit  2017  1:45p  [facility]  Sheryl Hernandez,  71104  
Z95.2



     Cma  M.D.    









 I35.1

 

 M35.9

 

 I77.810









 Office Visit  2017  8:00a  [facility]  Mai Arzate M.D.  
59258  M25.551



     [facility]      









 M25.552

 

 M16.12

 

 T84.052A









 Office Visit  2017  9:20a  [facility]  Kinjal Tam,  
51290  Z00.00



     - Clayton MILNER    









 E78.5

 

 I71.4

 

 Z12.11

 

 M25.551

 

 R13.10

 

 Z95.2









 Office Visit  2017  8:20a  [facility]  Sheryl Hernandez M.D.  
45219  Z95.2



     [facility] AT [facility]      









 I35.1

 

 R42

 

 M35.9

 

 I71.2

 

 I71.4

 

 I34.0









 Office Visit  01/10/2017  8:30a  [facility]  Jaclyn Ornelas,  58809  
G60.0



     Services Of Cma  M.D.    









 G56.02









 Office Visit  2016 11:20a  [facility] -  Vance Elizabeth NP  
45111  R10.9



     Bryant      

 

 Office Visit  2016  8:30a  [facility]  Jaclyn Ornelas,  27269  
G60.0



     Services Of Cma  M.D.    









 G56.02









 Office Visit  2016  9:45a  [facility]  Jaclyn Ornelas,  13013  
G56.02



     Services Of Cma  M.D.    









 R20.2

 

 G60.9









 Office Visit  2016  8:40a  [facility]  Sheryl Hernandez M.D.  
63820  R58



     [facility]      









 Z95.2









 Office Visit  2016  3:20p  [facility]  Kinjal Turcios,  
31221  R58



     - Clayton MILNER    

 

 Office Visit  01/15/2016  3:20p  [facility]  Kinjal Turcios,  
49357  Z79.01



     - Clayton MILNER    









 R58









 Office Visit  2016  8:40a  [facility]  Kinjal Turcios,  
35746  Z00.00



     - Clayton MILNER    









 E78.0

 

 Z79.01

 

 I71.4

 

 Z23









 Office Visit  2015  9:45a  [facility]  Jaclyn Ornelas,  89128  
782.0



     Services Of Mariola MILNER    









 354.0

 

 357.4

 

 356.9









 Office Visit  2015  4:04p  [facility]  Kinjal Turcios  
18675  424.1



     - Clayton MILNER    









 V43.3

 

 V58.61









 Office Visit  2015  3:00p  [facility] -  Vance Elizabeth NP  
77691  780.4



     Bryant      

 

 Office Visit  2015 11:00a  [facility]  Sheryl Hernandez M.D.  
86216  V43.3









 447.71

 

 441.40

 

 710.9

 

 780.4









 Office Visit  2015  3:58p  [facility]  Kinjal Turcios,  
73206  424.1



     - Clayton MILNER    









 V58.61









 Office Visit  2015  9:30a  [facility]  Jaclyn Ornelas,  
11780  782.0



       MTIA    









 354.0









 Office Visit  2015  9:00a  [facility]  Kinjal Turcios,  
11966  V70.0



     - Clayton MILNER    









 272.0

 

 401.1

 

 V43.3

 

 782.0

 

 V73.89

 

 441.4

 

 719.45

 

 v03.82

 

 788.62









 Office Visit  2015 12:34p  [facility]  Kinjal Turcios  
91736  V43.3



     - Clayton MILNER    









 V58.61









 Office Visit  2014  3:00p  [facility]   Sheryl Hernandez M.D.  
72814  V45.01



     [facility]      









 424.1

 

 V43.3

 

 447.70









 Office Visit  2014  9:30a  [facility]  DIDI Espino  
86984  424.1









 401.1









 Office Visit  2014  9:15a  [facility]  Sheryl Hernandez M.D.  
97888  V43.3



     [facility]      









 780.4

 

 272.0

 

 424.0

 

 447.70









 Office Visit  2013 10:00a  [facility] -  Dianna Blanco M.D.,  
14087  V70.0



     Bryant  FACP    









 424.1

 

 V43.3

 

 564.1

 

 592.0

 

 272.0

 

 110.5

 

 088.81









 Office Visit  2013  3:30p  [facility]  Sheryl Hernandez M.D.  
67013  424.1



     [facility]      









 780.4

 

 427.69









 Office Visit  2013  1:00p  [facility]  Dianna Blanco M.D.,  
36502  V43.64



     - Bryant  FACP    









 272.0









 Office Visit  2013  9:00a  [facility]  Jaclyn Ornelas,  20804  
435.9



     [facility]  M.D.    









 356.1









 Office Visit  2012  9:20a  [facility] -  Dianna Blanco M.D.,  
10077  V70.0



     Bryant  FACP    









 V43.3

 

 592.0

 

 272.0









 Office Visit  04/10/2012 10:00a  [facility]  Dianna Blanco M.D.,  
79446  365.10



     - Bryant  FACP    









 272.0

 

 719.46









 Office Visit  2011  4:00p  DO Not Use [facility]  Dianna Blanco  79159
  272.0



       M.DJudi, FACP    









 V76.41

 

 V58.61

 

 719.45

 

 719.46









 Office Visit  2011  9:20a  DO Not Use [facility]  Dianna Blanco,  78013
  V43.3



       MJudiDJudi, FACP    









 726.19

 

 592.0

 

 V70.0

 

 366.12

 

 782.0

 

 V06.1

 

 V05.8









 Office Visit  2011  9:00a  DO Not Use [facility]  Dianna Blanco,  49773
  719.45



       MTIA, FACP    

 

 Office Visit  2010 10:00a  DO Not Use [facility]  Dianna Bella,  61249
  285.9



       M.D., FACP    









 459.0









 Office Visit  2010 12:00p  DO Not Use [facility]  Dianna Bella,  63893
  285.9



       M.D., FACP    









 V43.64









 Office Visit  2010 10:00a  DO Not Use [facility]  Dianna Bella,  74346
  V72.84



       M.D., FACP    









 253.0

 

 786.09

 

 V43.3









 Office Visit  10/08/2009 12:00p  DO Not Use [facility]  Dianna Bella,  26211
  719.45



       M.D., FACP    









 272.4









 Office Visit  2009  4:30p  DO Not Use [facility]  Dianna Bella,  03044
  878.2



       M.D., FACP    

 

 Office Visit  2009 12:00p  DO Not Use [facility]  Dianna Bella,  99712
  272.0



       M.D., FACP    









 719.45









 Office Visit  2009 10:00a  DO Not Use [facility]  Dianna Bella,  15247
  V70.0



       M.D., FACP    









 253.0

 

 272.4

 

 715.95









 Office Visit  2008 10:00a  DO Not Use [facility]  Dianna Bella,  35721
  724.2



       M.D., FACP    









 272.0

 

 600.00

 

 401.1









 Office Visit  2008 10:00a  DO Not Use [facility]  Dianna Bella,  02444
  V70.0



       M.D., FACP    









 715.90

 

 V43.3









 Office Visit  10/01/2007  9:45a  DO Not Use [facility]  Dianna Bella,  67726
  719.41



       M.D., FACP    









 719.46









 Office Visit  2007  2:15p  DO Not Use [facility]  Dianna Bella,  40793
  719.46



       M.D., FACP    









 272.0

 

 715.95









 Office Visit  2007  4:00p  DO Not Use [facility]  Dianna Bella,  11448
  V70.0



       M.D., FACP    









 272.0







Plan of Care

Future Appointment(s):2018  8:30 am - Zeynep Bautista N.P. at [facility]2019  8:30 am - Salvatore Thompson M.D. at [place]2019  8:00 am - Kinjal Turcios M.D. at [facility] 
Internal Medicine - Nyqziksnq26/20/2018 - Sheryl Hernandez M.D.Z95.2 Presence of 
prosthetic heart valveComments:Good function on echo. Stress test reassuring 
showing normal strength of the heart (52% at rest, 61%post exercise).Follow up:
6 month OV.M79.1 MyalgiaFollow up:OV 4-8 weeks w [facility] NP.Recommendations:STOP 
atorvastatin for now.  If muscle pain after mowing the lawn then get labs 
checked, will look for muscle breakdown.I71.2 Thoracic aortic aneurysm, without 
ruptureComments:OK to trial viagra, stay hydrated.

## 2018-10-13 NOTE — XMS REPORT
Continuity of Care Document (CCD)

 Created on:2018



Patient:Familia Vivar

Sex:Male

:1950

External Reference #:[id number]





Demographics







 Address  [address] 



   [place] [address]

 

 Home Phone  [phone]

 

 Preferred Language  en

 

 Marital Status  Not  or 

 

 Yarsani Affiliation  Unknown

 

 Race  White

 

 Ethnic Group  Not  or 









Author







 Name  Yair Miles, OD

 

 Address  [address]



   Unavailable



   [place] [address]









Care Team Providers







 Name  Role  Phone

 

 Tam DELAROSA, Zahra  Care Team Information   Unavailable

 

 Zahra Turcios MD  Primary Care Physician  Unavailable









Payers







 Type  Date  Identification Numbers  Payment Provider  Subscriber

 

     Policy Number: [id number]  [employer]  Familia Vivar









 PayID: [id number]  [address]









 [place] [address]







Advance Directives







 Description

 

 No Information Available







Problems







 Date  Description  Provider  Status

 

 Onset: 2014  Low tension glaucoma  Jono Lou M.D.  Active







Family History







 Date  Family Member(s)  Problem(s)  Comments

 

   Father  High BP  

 

   Father  Diabetes  

 

   Father  Heart Disease  

 

   Mother  Cataract  

 

   Mother  Arthritis  







Social History







 Type  Date  Description  Comments

 

 Birth Sex    Unknown  

 

 ETOH Use    Denies alcohol use  

 

 Tobacco Use  Start: Unknown  Patient has never smoked  

 

 Smoking Status  Reviewed: 10/09/18  Patient has never smoked  







Allergies, Adverse Reactions, Alerts







 Description

 

 No Known Drug Allergies







Medications







 Medication  Date  Status  Form  Strength  Qnty  SIG  Indications  Ordering



                 Provider

 

 Latanoprost  10/09/  Active  Solution  0.005%  7.5uni  1 drops    Yair



           ts  both eyes    Jd, OD



             every    



             night    

 

 Timolol Maleate  10/09/  Active  Solution  0.5%  15ml  one drop    Yair



             twice a    Miles, OD



             day both    



             eyes    

 

 Warfarin  00//  Active            Unknown



   0000              

 

 Atorvastatin  0000/  Active  Tablets  40mg  30tabs  1 po qd    Unknown



 Calcium  0000              

 

 Coenzyme Q10  /  Active  Capsules  200-20mg-U        Unknown



   0000      nit        

 

 Fish Oil  /  Active            Unknown



   0000              

 

 Atorvastatin  00/00/  Active  Tablets  10mg    take 1    Unknown



 Calcium  0000          tablet by    



             mouth    



             once    



             daily    

 

 Metoprolol  /  Active  Tablets ER  50mg        Unknown



 Succinate ER  0000    24HR          

 

                 

 

 Latanoprost  /  Hx  Solution  0.005%  7.5uni  1 drops    Jono



   2017 -        ts  both eyes    Carmine



   10/09/          every    M.D.



             night    

 

 Latanoprost  07/10/  Hx  Solution  0.005%  2.5uni  1 drops    Jono



    -        ts  both eyes    Lou,



   /          every    M.D.



             night    

 

 Timolol Maleate  /  Hx  Solution  0.5%  15unit  instill 1  H40.1231  Jono



    -        s  drop into    Lou,



   10/09/          both eyes    M.D.



             twice a    



             day    

 

 Lumigan  04/10/  Hx  Solution  0.01%  1ml  1 drops  365.12  Jono



    -          both eyes    Lou,



   /          before    M.D.



             bed    

 

 Latanoprost  /  Hx  Solution  0.005%  2.5uni  1 drops  365.12  Jono



    -        ts  both eyes    Lou,



   04/10/          every    M.D.



             night    

 

 Latanoprost  /  Hx  Solution  0.005%  3bottl  1 drops  365.12  Jono



    -        es  both eyes    Lou,



   07/10/          every    M.D.



             night    

 

 Latanoprost  /  Hx  Solution  0.005%  3units  1 drops  365.12  Jono



    -          both eyes    Lou,



   07/10/          every    M.D.



             night    

 

 Latanoprost  /  Hx  Solution  0.005%  3units  1 drops  365.12  Jono



    -          both eyes    Lou,



   07/10/          every    M.D.



   2015          night    

 

 Latanoprost  /  Hx  Solution  0.005%  3units  1 drops  H40.1231  Jono



    -          both eyes    Lou,



   /          every    M.D.



             night    







Immunizations







 Description

 

 No Information Available







Vital Signs







 Date  Vital  Result  Comment

 

 10/09/2018  9:16am  Intraocular Pressure Right Eye  9 mmHg  









 Intraocular Pressure Left Eye  9 mmHg  









 07/10/2018  8:40am  Intraocular Pressure Right Eye  10 mmHg  









 Intraocular Pressure Left Eye  10 mmHg  









 2018  9:30am  Intraocular Pressure Right Eye  10 mmHg  









 Intraocular Pressure Left Eye  10 mmHg  









 2018  8:29am  Intraocular Pressure Right Eye  11 mmHg  









 Intraocular Pressure Left Eye  11 mmHg  









 2017  8:18am  Intraocular Pressure Right Eye  10 mmHg  









 Intraocular Pressure Left Eye  10 mmHg  









 2017  9:17am  Intraocular Pressure Right Eye  9 mmHg  









 Intraocular Pressure Left Eye  9 mmHg  









 2017  8:54am  Intraocular Pressure Right Eye  11 mmHg  









 Intraocular Pressure Left Eye  11 mmHg  









 2016  9:36am  Intraocular Pressure Right Eye  10 mmHg  









 Intraocular Pressure Left Eye  10 mmHg  









 2016  9:45am  Intraocular Pressure Right Eye  10 mmHg  









 Intraocular Pressure Left Eye  10 mmHg  









 2016 10:06am  Intraocular Pressure Right Eye  10 mmHg  









 Intraocular Pressure Left Eye  12 mmHg  









 2016  8:45am  Intraocular Pressure Right Eye  10 mmHg  









 Intraocular Pressure Left Eye  10 mmHg  









 2015  2:19pm  Intraocular Pressure Right Eye  10 mmHg  









 Intraocular Pressure Left Eye  10 mmHg  









 05/15/2015  9:40am  Intraocular Pressure Right Eye  11 mmHg  









 Intraocular Pressure Left Eye  11 mmHg  









 2015  9:01am  Intraocular Pressure Right Eye  12 mmHg  









 Intraocular Pressure Left Eye  13 mmHg  









 04/10/2015 10:16am  Intraocular Pressure Right Eye  11 mmHg  









 Intraocular Pressure Left Eye  14 mmHg  









 2015  8:40am  Intraocular Pressure Right Eye  10 mmHg  









 Intraocular Pressure Left Eye  11 mmHg  









 10/03/2014  8:58am  Intraocular Pressure Right Eye  12 mmHg  









 Intraocular Pressure Left Eye  14 mmHg  









 2014  2:49pm  Intraocular Pressure Right Eye  12 mmHg  









 Intraocular Pressure Left Eye  12 mmHg  









 2014  8:54am  Intraocular Pressure Right Eye  11 mmHg  









 Intraocular Pressure Left Eye  11 mmHg  









 2014  8:38am  Intraocular Pressure Right Eye  14 mmHg  









 Intraocular Pressure Left Eye  14 mmHg  







Results







 Description

 

 No Information Available







Procedures







 Date  Code  Description  Status

 

 07/10/2018  69234  Visual Field Exam Extended, Unilateral Or Bilateral  
Completed

 

 07/10/2018  14675  Eye Exam Est Intermediate  Completed

 

 2018  20418  Oct, Optic Nerve  Completed

 

 2018  62507  Eye Exam Est Intermediate  Completed

 

 2018  49247  Eye Exam Est Intermediate  Completed

 

 2017  19038  Fundus Photography W/Interpretation & Report  Completed

 

 2017  89664  Refraction  Completed

 

 2017  05016  Eye Exam Est Intermediate  Completed

 

 2017  83717  Eye Exam Est Intermediate  Completed

 

 2017  46297  Visual Field Exam Extended, Unilateral Or Bilateral  
Completed

 

 2017  27184  Oct, Optic Nerve  Completed

 

 2017  88993  Eye Exam Est Intermediate  Completed

 

 2016  43595  Eye Exam Est Intermediate  Completed

 

 2016  02451  Fundus Photography W/Interpretation & Report  Completed

 

 2016  36733  Eye Exam Est Comprehensive  Completed

 

 2016  73193  Visual Field Exam Extended, Unilateral Or Bilateral  
Completed

 

 2016  29150  Eye Exam Est Intermediate  Completed

 

 2016  81037  Eye Exam Est Intermediate  Completed

 

 2016  09760  Oct, Optic Nerve  Completed

 

 2015  80068  Eye Exam Est Intermediate  Completed

 

 04/10/2015  68123  Visual Field Exam Extended, Unilateral Or Bilateral  
Completed

 

 04/10/2015  87009  Eye Exam Est Intermediate  Completed

 

 2015  39332  Fundus Photography W/Interpretation & Report  Completed

 

 2015  04135  Ophthalmoscopy Subsequent  Completed

 

 2015  40778  Eye Exam Est Comprehensive  Completed

 

 10/03/2014  92341  Oct, Optic Nerve  Completed

 

 10/03/2014  07210  Eye Exam Est Intermediate  Completed

 

 2014  27937  Eye Exam Est Intermediate  Completed

 

 2014  43771  Visual Field Exam Extended, Unilateral Or Bilateral  
Completed

 

 2014  66584  Eye Exam Est Intermediate  Completed

 

 2014  76178  Eye Exam Est Intermediate  Completed

 

 2011  69814  Visual Field Exam Extended, Unilateral Or Bilateral  
Completed

 

 2011  63035  Fundus Photography W/Interpretation & Report  Completed

 

 2011  51476  Ophthalmoscopy Initial  Completed

 

 2011  98669  Refraction  Completed

 

 2011  35382  Eye Exam New Comprehensive  Completed







Encounters







 Type  Date  Location  Provider  Dx  Diagnosis

 

 Office Visit  05/15/2015  Main Office  Jono Lou,  365.12  Glaucoma Low 
Tension



   9:30a    M.D.    

 

 Office Visit  2015  Main Office  Jono Lou,  365.12  Glaucoma Low 
Tension



   8:45a    M.D.    

 

 Office Visit  2012  Main Office  Jono Lou,  365.10  Glaucoma Open 
Angle



   11:00a    M.D.    Unspec

 

 Office Visit  2012  Main Office  Jono Lou,  365.00  Preglaucoma 
Unspec



   11:30a    M.D.    

 

 Office Visit  2011  Main Office  Jono Lou,  365.00  Preglaucoma 
Unspec



   1:00p    M.D.    







Plan of Treatment

10/09/2018 - Yair Miles, ODH40.1231 Low-tension glaucoma, bilateral, mild 
ddunxD04.13 Age-related nuclear cataract, neudwhnexN22.4 PresbyopiaFollow up:3 
mos IOP

## 2018-10-14 VITALS — DIASTOLIC BLOOD PRESSURE: 73 MMHG | SYSTOLIC BLOOD PRESSURE: 109 MMHG

## 2018-10-14 LAB
BASOPHILS # BLD AUTO: 0 10^3/UL (ref 0–0.2)
EOSINOPHIL # BLD AUTO: 0.1 10^3/UL (ref 0–0.6)
HCT VFR BLD AUTO: 44 % (ref 42–52)
HGB BLD-MCNC: 15.3 G/DL (ref 14–18)
INR PPP/BLD: 2.8 (ref 0.77–1.02)
LYMPHOCYTES # BLD AUTO: 1.1 10^3/UL (ref 1–4.8)
MCH RBC QN AUTO: 33 PG (ref 27–31)
MCHC RBC AUTO-ENTMCNC: 35 G/DL (ref 31–36)
MCV RBC AUTO: 94 FL (ref 80–94)
MONOCYTES # BLD AUTO: 0.5 10^3/UL (ref 0–0.8)
NEUTROPHILS # BLD AUTO: 6 10^3/UL (ref 1.5–7.7)
NRBC # BLD AUTO: 0 10^3/UL
NRBC BLD QL AUTO: 0.1
PLATELET # BLD AUTO: 115 10^3/UL (ref 150–450)
RBC # BLD AUTO: 4.7 10^6/UL (ref 4–5.4)
WBC # BLD AUTO: 7.6 10^3/UL (ref 3.5–10.8)

## 2018-10-14 NOTE — ED
Abdominal Pain/Male





- HPI Summary


HPI Summary: 





Pt is 69 y/o M who presents to ED c/o mid abdominal pain since 1900 this 

evening. He describes the pain as sharp, intermittent, and rates it a 7/10 in 

severity. Some pain was alleviated by passing a bowel movement.  Pain did not 

radiate to his back. Notes some nausea, dizziness, and face was a yellow-gray 

color. Denies vomiting. PMHx of AAA, and denies PMHx of myocardial infarction. 

Pt takes warfarin for mechanical valve and is on beta blockers. PMHx of renal 

calculi that have been sitting in his kidneys for a couple of years since he 

still hasnt passed them. 





- History of Current Complaint


Chief Complaint: EDAbdPain


Stated Complaint: ABD PAIN


Time Seen by Provider: 10/13/18 23:26


Hx Obtained From: Patient


Onset/Duration: Sudden Onset, Lasting Hours, Still Present


Timing: Intermittent


Severity Currently: Moderate


Pain Intensity: 7


Pain Scale Used: 0-10 Numeric


Radiates: No


Character: Sharp


Aggravating Factor(s): Nothing


Alleviating Factor(s): Bowel Movement


Associated Signs And Symptoms: Positive: Dizzy, Nausea.  Negative: Vomiting





- Allergies/Home Medications


Allergies/Adverse Reactions: 


 Allergies











Allergy/AdvReac Type Severity Reaction Status Date / Time


 


No Known Allergies Allergy   Verified 10/13/18 23:15














PMH/Surg Hx/FS Hx/Imm Hx


Endocrine/Hematology History: 


   Denies: Hx Diabetes


Cardiovascular History: 


   Denies: Hx Hypertension, Hx Pacemaker/ICD


Respiratory History: 


   Denies: Hx Asthma, Hx Chronic Obstructive Pulmonary Disease (COPD)


 History: 


   Denies: Hx Renal Disease


Musculoskeletal History: 


   Denies: Hx Rheumatoid Arthritis, Hx Osteoporosis


Sensory History: 


   Denies: Hx Hearing Aid


Psychiatric History: 


   Denies: Hx Panic Disorder





- Surgical History


Surgery Procedure, Year, and Place: 1983 - THORACIC/AAA REPAIR ( JASE-SHILEY 

MECHANICAL VALVE - PER DR TURCIOS'S REPORT - IN PT'S EMR { SAFE OR CONDITIONAL 1 

FOR ONLY 1.5T MRI} ) AND AORTIC VALVE REPAIR.  Rt TOTAL HIP REPLACEMENT





- Immunization History


Date of Tetanus Vaccine: utd


Date of Influenza Vaccine: fall 2018


Infectious Disease History: No


Infectious Disease History: 


   Denies: Traveled Outside the [place] in Last 30 Days





- Social History


Alcohol Use: None


Substance Use Type: Reports: None


Smoking Status (MU): Never Smoked Tobacco





Review of Systems


Positive: Other - Dizziness, yellow-gray color in face


Positive: Abdominal Pain, Nausea.  Negative: Vomiting


All Other Systems Reviewed And Are Negative: Yes





Physical Exam





- Summary


Physical Exam Summary: 


VITAL SIGNS: Reviewed.


GENERAL: Patient is a well-developed and nourished (MALE OR FEMALE) who is 

lying comfortable in the stretcher. Patient is not in any acute respiratory 

distress.


HEAD AND FACE: No signs of trauma. No ecchymosis, hematomas or skull 

depressions. No sinus tenderness.


EYES: PERRLA, EOMI x 2, No injected conjunctiva, no nystagmus.


EARS: Hearing grossly intact. Ear canals and tympanic membranes are within 

normal limits.


MOUTH: Oropharynx within normal limits.


NECK: Supple, trachea is midline, no adenopathy, no JVD, no carotid bruit, no c-

spine tenderness, neck with full ROM.


CHEST: Clicks over aortic area, no tenderness at palpation


LUNGS: Clear to auscultation bilaterally. No wheezing or crackles.


CVS: Regular rate and rhythm, S1 and S2 present, no murmurs or gallops 

appreciated


ABDOMEN: Soft, non-tender. No signs of distention. No rebound no guarding, and 

no masses palpated. Bowel sounds are normal.


EXTREMITIES: Bilateral equal strong femoral pulses, FROM in all major joints, 

no edema, no cyanosis or clubbing.


NEURO: Alert and oriented x 3. No acute neurological deficits. Speech is normal 

and follows commands.


SKIN: Dry and warm


Triage Information Reviewed: Yes


Vital Signs On Initial Exam: 


 Initial Vitals











Temp Pulse Resp BP Pulse Ox


 


 97.6 F   82   16   126/78   98 


 


 10/13/18 23:10  10/13/18 23:10  10/13/18 23:10  10/13/18 23:10  10/13/18 23:10











Vital Signs Reviewed: Yes





Diagnostics





- Vital Signs


 Vital Signs











  Temp Pulse Resp BP Pulse Ox


 


 10/13/18 23:10  97.6 F  82  16  126/78  98














- Laboratory


Lab Results: 


 Lab Results











  10/13/18 10/13/18 10/13/18 Range/Units





  23:59 23:59 23:59 


 


WBC  7.6    (3.5-10.8)  10^3/ul


 


RBC  4.70    (4.00-5.40)  10^6/ul


 


Hgb  15.3    (14.0-18.0)  g/dl


 


Hct  44    (42-52)  %


 


MCV  94    (80-94)  fL


 


MCH  33 H    (27-31)  pg


 


MCHC  35    (31-36)  g/dl


 


RDW  13    (10.5-15)  %


 


Plt Count  115 L    (150-450)  10^3/ul


 


MPV  7.8    (7.4-10.4)  um3


 


Neut % (Auto)  78.1    (38-83)  %


 


Lymph % (Auto)  14.2 L    (25-47)  %


 


Mono % (Auto)  6.5    (0-7)  %


 


Eos % (Auto)  0.9    (0-6)  %


 


Baso % (Auto)  0.3    (0-2)  %


 


Absolute Neuts (auto)  6.0    (1.5-7.7)  10^3/ul


 


Absolute Lymphs (auto)  1.1    (1.0-4.8)  10^3/ul


 


Absolute Monos (auto)  0.5    (0-0.8)  10^3/ul


 


Absolute Eos (auto)  0.1    (0-0.6)  10^3/ul


 


Absolute Basos (auto)  0    (0-0.2)  10^3/ul


 


Absolute Nucleated RBC  0    10^3/ul


 


Nucleated RBC %  0.1    


 


INR (Anticoag Therapy)     (0.77-1.02)  


 


APTT     (26.0-36.3)  seconds


 


Sodium   140   (135-145)  mmol/L


 


Potassium   4.6   (3.5-5.0)  mmol/L


 


Chloride   107   (101-111)  mmol/L


 


Carbon Dioxide   30   (22-32)  mmol/L


 


Anion Gap   3   (2-11)  mmol/L


 


BUN   19   (6-24)  mg/dL


 


Creatinine   1.08   (0.67-1.17)  mg/dL


 


Est GFR ( Amer)   82.3   (>60)  


 


Est GFR (Non-Af Amer)   68.0   (>60)  


 


BUN/Creatinine Ratio   17.6   (8-20)  


 


Glucose   116 H   ()  mg/dL


 


Lactic Acid    0.9  (0.5-2.0)  mmol/L


 


Calcium   9.2   (8.6-10.3)  mg/dL


 


Magnesium   2.3   (1.9-2.7)  mg/dL


 


Total Bilirubin   0.60   (0.2-1.0)  mg/dL


 


AST   24   (13-39)  U/L


 


ALT   21   (7-52)  U/L


 


Alkaline Phosphatase   81   ()  U/L


 


Troponin I   0.00   (<0.04)  ng/mL


 


C-Reactive Protein   < 1.00   (<8.01)  mg/L


 


Total Protein   6.5   (6.4-8.9)  g/dL


 


Albumin   4.3   (3.2-5.2)  g/dL


 


Globulin   2.2   (2-4)  g/dL


 


Albumin/Globulin Ratio   2.0   (1-3)  


 


Amylase   39   ()  U/L


 


Lipase   25   (11.0-82.0)  U/L


 


Blood Type     


 


Antibody Screen     














  10/13/18 10/13/18 Range/Units





  23:59 23:59 


 


WBC    (3.5-10.8)  10^3/ul


 


RBC    (4.00-5.40)  10^6/ul


 


Hgb    (14.0-18.0)  g/dl


 


Hct    (42-52)  %


 


MCV    (80-94)  fL


 


MCH    (27-31)  pg


 


MCHC    (31-36)  g/dl


 


RDW    (10.5-15)  %


 


Plt Count    (150-450)  10^3/ul


 


MPV    (7.4-10.4)  um3


 


Neut % (Auto)    (38-83)  %


 


Lymph % (Auto)    (25-47)  %


 


Mono % (Auto)    (0-7)  %


 


Eos % (Auto)    (0-6)  %


 


Baso % (Auto)    (0-2)  %


 


Absolute Neuts (auto)    (1.5-7.7)  10^3/ul


 


Absolute Lymphs (auto)    (1.0-4.8)  10^3/ul


 


Absolute Monos (auto)    (0-0.8)  10^3/ul


 


Absolute Eos (auto)    (0-0.6)  10^3/ul


 


Absolute Basos (auto)    (0-0.2)  10^3/ul


 


Absolute Nucleated RBC    10^3/ul


 


Nucleated RBC %    


 


INR (Anticoag Therapy)  2.80 H   (0.77-1.02)  


 


APTT  34.6   (26.0-36.3)  seconds


 


Sodium    (135-145)  mmol/L


 


Potassium    (3.5-5.0)  mmol/L


 


Chloride    (101-111)  mmol/L


 


Carbon Dioxide    (22-32)  mmol/L


 


Anion Gap    (2-11)  mmol/L


 


BUN    (6-24)  mg/dL


 


Creatinine    (0.67-1.17)  mg/dL


 


Est GFR ( Amer)    (>60)  


 


Est GFR (Non-Af Amer)    (>60)  


 


BUN/Creatinine Ratio    (8-20)  


 


Glucose    ()  mg/dL


 


Lactic Acid    (0.5-2.0)  mmol/L


 


Calcium    (8.6-10.3)  mg/dL


 


Magnesium    (1.9-2.7)  mg/dL


 


Total Bilirubin    (0.2-1.0)  mg/dL


 


AST    (13-39)  U/L


 


ALT    (7-52)  U/L


 


Alkaline Phosphatase    ()  U/L


 


Troponin I    (<0.04)  ng/mL


 


C-Reactive Protein    (<8.01)  mg/L


 


Total Protein    (6.4-8.9)  g/dL


 


Albumin    (3.2-5.2)  g/dL


 


Globulin    (2-4)  g/dL


 


Albumin/Globulin Ratio    (1-3)  


 


Amylase    ()  U/L


 


Lipase    (11.0-82.0)  U/L


 


Blood Type   A Positive  


 


Antibody Screen   Negative  











Result Diagrams: 


 10/13/18 23:59





 10/13/18 23:59


Lab Statement: Any lab studies that have been ordered have been reviewed, and 

results considered in the medical decision making process.





- CT


  ** Chest CT


CT Interpretation Completed By: Radiologist - IMPRESSION: 1. No evidence of an 

aortic dissection or aneurysm. Infrarenal abdominal aortic aneurysm with a 

maximum diameter 3.6 cm. This extends to the iliac bifurcation in which there 

is fusiform distention of both common iliac arteries. 2. Abnormal distention of 

the small bowel. This consistent with a partial bowel obstruction. The exact 

transition point is not clear. No bowel wall thickening or effusion. No 

evidence of bowel perforation. Small amount of fluid is located in the right 

paracolic gutter.





- EKG


  ** 00:03


Cardiac Rate: Bradycardia - 51 bpm


EKG Rhythm: Sinus Rhythm


EKG Interpretation: IVCD





Abdominal Pain Fem Course/Dx





- Course


Course Of Treatment: Pt is 69 y/o M who presents to ED c/o mid abdominal pain 

since 1900 this evening. He describes the pain as sharp, intermittent, and 

rates it a 7/10 in severity. Some pain was alleviated by passing a bowel 

movement.  Pain did not radiate to his back. Notes some nausea, dizziness, and 

face was a yellow-gray color. Denies vomiting. PMHx of AAA, and denies PMHx of 

myocardial infarction. Pt takes warfarin for mechanical valve and is on beta 

blockers. PMHx of renal calculi that have been sitting in his kidneys for a 

couple of years since he still hasnt passed them. Physical exam revealed 

clicks over aortic area and bilateral equal strong femoral pulses. EKG taken at 

00:03 shows sinus at 51 bpm with IVCD. Chest CT shows no evidence of aortic 

dissection or aneurysm. Chest CT shows abnormal distention of the small bowel. 

Pt did have bowel movement in ER that was diarrhea and is pain free at this 

point. Pt was diagnosed with acute gastroenteritis and discharged home.





- Diagnoses


Provider Diagnoses: 


 Gastroenteritis








Discharge





- Sign-Out/Discharge


Documenting (check all that apply): Patient Departure - Discharge





- Discharge Plan


Condition: Stable


Disposition: HOME


Patient Education Materials:  Enteritis (ED)


Referrals: 


Kinjal Turcios MD [Primary Care Provider] - 2 Days


Additional Instructions: 


RETURN TO THE EMERGENCY DEPARTMENT FOR CHANGING OR WORSENING SYMPTOMS. FOLLOW 

UP WITH PCP IN 1-2 DAYS.





- Attestation Statements


Document Initiated by Scribe: Yes


Documenting Scribe: Zuleika Salas


Provider For Whom Scribe is Documenting (Include Credential): Dr. Surendra Whitney MD


Scribe Attestation: 


Zuleika BURGOS, scribed for Dr. Surendra Whitney MD on 10/14/18 at 0349.

## 2018-10-14 NOTE — RAD
EXAM:

  CT Angiography Chest With Intravenous Contrast



CLINICAL HISTORY:

  68 years old, male; Pain; Other: Abdominal; Abdominal pain; Generalized; 

Additional info: Abd pain/aortic disection



TECHNIQUE:

  Axial computed tomographic angiography images of the chest with intravenous 

contrast using pulmonary embolism protocol.  All CT scans at this facility use 

at least one of these dose optimization techniques: automated exposure control; 

mA and/or kV adjustment per patient size (includes targeted exams where dose is 

matched to clinical indication); or iterative reconstruction.

  MIP reconstructed images were created and reviewed.

  Coronal and sagittal reformatted images were created and reviewed.



CONTRAST:

  95 mL of omni 350 administered intravenously.  



COMPARISON:

  No relevant prior studies available.



FINDINGS:

  Pulmonary arteries:  Unremarkable.  No pulmonary embolism.

  Aorta:  No evidence of an aneurysm involving the ascending aorta. The 

diameter of ascending aorta is 3.6 cm.

  Lungs:  No pulmonary consolidation. Linear opacities in lung bases.

  Pleural space:  Unremarkable.  No significant effusion.  No pneumothorax.

  Heart:  Patient is status post open heart surgery. There has been an aortic 

valvular replacement. Cardiac size is normal. No pericardial thickening or 

effusion.  No evidence of RV dysfunction.

  Bones/joints:  No acute fracture.  No dislocation.

  Soft tissues:  Unremarkable.

  Lymph nodes:  No mediastinal adenopathy.



IMPRESSION:     

1. No evidence of aortic dissection or aneurysm.

  2. Patient status post open heart surgery numerous there is been an aortic 

valve repair. No evidence of congestive heart failure or pneumonia.





_______________________________________________



EXAM:

  CT Angiography Chest With Intravenous Contrast

  CT Angiography Abdomen and Pelvis With Intravenous Contrast



CLINICAL HISTORY:

  68 years old, male; Pain; Other: Abdominal; Abdominal pain; Generalized; 

Additional info: Abd pain/aortic disection



TECHNIQUE:

  Axial computed tomographic angiography images of the chest, abdomen and 

pelvis with intravenous contrast using CT angiography protocol.  All CT scans 

at this facility use at least one of these dose optimization techniques: 

automated exposure control; mA and/or kV adjustment per patient size (includes 

targeted exams where dose is matched to clinical indication); or iterative 

reconstruction.

  MIP reconstructed images were created and reviewed.

  Coronal and sagittal reformatted images were created and reviewed.



CONTRAST:

  95 mL of omni 350 administered intravenously.  95 mL of omni 350 administered 

intravenously.  



COMPARISON:

  CTA CHEST CTA CHEST 1/25/2017 8:22 AM



FINDINGS:



 VASCULATURE:

  Aorta:  Infrarenal abdominal aortic aneurysm. Diameter measures 3.6 cm. No 

evidence of a dissection. The aneurysm extends into both common iliac arteries. 

The diameter of the right common iliac artery is 2.5 cm. The diameter of the 

left common iliac artery is 2.8 cm.

  Pulmonary arteries:  Unremarkable as visualized.  No pulmonary embolism is 

identified.

  Great vessels of aortic arch:  No acute findings.  No dissection.  No 

arterial occlusion or significant stenosis.

  Celiac trunk and mesenteric arteries:  No acute findings.  No occlusion or 

significant stenosis.

  Renal arteries:  No acute findings.  No occlusion or significant stenosis.

  Iliac arteries:  See above.



 CHEST:

  Lungs:  Unremarkable.  No mass.  No consolidation.

  Pleural space:  Unremarkable.  No significant effusion.  No pneumothorax.

  Heart:  Unremarkable.  No cardiomegaly.  No significant pericardial effusion.



 ABDOMEN:

  Liver:  The liver is of normal size and appearance. No focal lesion.

  Gallbladder and bile ducts:  Unremarkable.  No calcified stones.  No ductal 

dilation.

  Pancreas:  Unremarkable.  No ductal dilation.  No mass.

  Spleen:  The spleen is of normal size and appearance.

  Adrenals:  The adrenal glands are normal.

  Kidneys and ureters:  The kidneys are of normal size. In the upper pole of 

the right kidney is a simple cyst measuring 1.5 cm in greatest dimension.  No 

hydronephrosis.  No solid mass.

  Stomach and bowel:  Dilated loops of small bowel. The exact transition is not 

clear. No bowel wall thickening or pneumatosis. The colon has a normal caliber. 

No bowel wall thickening. No bowel wall pneumatosis. Small amount of fluid in 

the abdomen.



 PELVIS:

  Appendix:  No findings to suggest acute appendicitis.

  Bladder:  Unremarkable.  No mass.

  Reproductive:  Unremarkable as visualized.



 CHEST, ABDOMEN and PELVIS:

  Intraperitoneal space:  Unremarkable.  No significant fluid collection.  No 

free air.

  Bones/joints:  Right hip arthroplasty.  No acute fracture.  No dislocation.

  Soft tissues:  Unremarkable.

  Lymph nodes:  Unremarkable.  No enlarged lymph nodes.



IMPRESSION:     

  1. No evidence of an aortic dissection or aneurysm. Infrarenal abdominal 

aortic aneurysm with a maximum diameter 3.6 cm. This extends to the iliac 

bifurcation in which there is fusiform distention of both common iliac arteries.

  2. Abnormal distention of the small bowel. This consistent with a partial 

bowel obstruction. The exact transition point is not clear. No bowel wall 

thickening or effusion. No evidence of bowel perforation. Small amount of fluid 

is located in the right paracolic gutter.





To contact [facility] with a general question: Operations Center - [phone]

For direct physician to physician contact: Physician Hotline - [phone]

[facility] (ad Facility ID #[id number])